# Patient Record
Sex: FEMALE | Race: WHITE
[De-identification: names, ages, dates, MRNs, and addresses within clinical notes are randomized per-mention and may not be internally consistent; named-entity substitution may affect disease eponyms.]

---

## 2018-06-02 ENCOUNTER — HOSPITAL ENCOUNTER (INPATIENT)
Dept: HOSPITAL 17 - NEPC | Age: 78
LOS: 2 days | Discharge: HOME | DRG: 292 | End: 2018-06-04
Attending: FAMILY MEDICINE | Admitting: FAMILY MEDICINE
Payer: MEDICARE

## 2018-06-02 VITALS
OXYGEN SATURATION: 91 % | DIASTOLIC BLOOD PRESSURE: 79 MMHG | TEMPERATURE: 97.7 F | SYSTOLIC BLOOD PRESSURE: 140 MMHG | HEART RATE: 77 BPM | RESPIRATION RATE: 20 BRPM

## 2018-06-02 VITALS
SYSTOLIC BLOOD PRESSURE: 136 MMHG | RESPIRATION RATE: 20 BRPM | DIASTOLIC BLOOD PRESSURE: 80 MMHG | HEART RATE: 78 BPM | TEMPERATURE: 98.4 F | OXYGEN SATURATION: 94 %

## 2018-06-02 VITALS — HEIGHT: 62 IN | BODY MASS INDEX: 31.16 KG/M2 | WEIGHT: 169.32 LBS

## 2018-06-02 VITALS
RESPIRATION RATE: 20 BRPM | OXYGEN SATURATION: 92 % | DIASTOLIC BLOOD PRESSURE: 69 MMHG | HEART RATE: 71 BPM | SYSTOLIC BLOOD PRESSURE: 115 MMHG | TEMPERATURE: 97.2 F

## 2018-06-02 VITALS — SYSTOLIC BLOOD PRESSURE: 128 MMHG | DIASTOLIC BLOOD PRESSURE: 84 MMHG | OXYGEN SATURATION: 95 %

## 2018-06-02 VITALS
DIASTOLIC BLOOD PRESSURE: 61 MMHG | OXYGEN SATURATION: 94 % | RESPIRATION RATE: 20 BRPM | SYSTOLIC BLOOD PRESSURE: 103 MMHG | HEART RATE: 72 BPM | TEMPERATURE: 97.8 F

## 2018-06-02 VITALS
SYSTOLIC BLOOD PRESSURE: 147 MMHG | RESPIRATION RATE: 26 BRPM | DIASTOLIC BLOOD PRESSURE: 92 MMHG | OXYGEN SATURATION: 94 % | HEART RATE: 94 BPM

## 2018-06-02 VITALS
RESPIRATION RATE: 24 BRPM | SYSTOLIC BLOOD PRESSURE: 149 MMHG | DIASTOLIC BLOOD PRESSURE: 67 MMHG | OXYGEN SATURATION: 93 % | HEART RATE: 96 BPM

## 2018-06-02 VITALS — OXYGEN SATURATION: 93 %

## 2018-06-02 DIAGNOSIS — I11.0: Primary | ICD-10-CM

## 2018-06-02 DIAGNOSIS — Z82.3: ICD-10-CM

## 2018-06-02 DIAGNOSIS — Z82.49: ICD-10-CM

## 2018-06-02 DIAGNOSIS — I25.5: ICD-10-CM

## 2018-06-02 DIAGNOSIS — Z90.49: ICD-10-CM

## 2018-06-02 DIAGNOSIS — I25.10: ICD-10-CM

## 2018-06-02 DIAGNOSIS — I42.0: ICD-10-CM

## 2018-06-02 DIAGNOSIS — Z95.1: ICD-10-CM

## 2018-06-02 DIAGNOSIS — Z95.5: ICD-10-CM

## 2018-06-02 DIAGNOSIS — Z79.01: ICD-10-CM

## 2018-06-02 DIAGNOSIS — I50.23: ICD-10-CM

## 2018-06-02 DIAGNOSIS — K11.8: ICD-10-CM

## 2018-06-02 DIAGNOSIS — M19.90: ICD-10-CM

## 2018-06-02 DIAGNOSIS — I44.7: ICD-10-CM

## 2018-06-02 DIAGNOSIS — J43.9: ICD-10-CM

## 2018-06-02 DIAGNOSIS — I25.2: ICD-10-CM

## 2018-06-02 DIAGNOSIS — R25.2: ICD-10-CM

## 2018-06-02 DIAGNOSIS — R73.9: ICD-10-CM

## 2018-06-02 DIAGNOSIS — I48.0: ICD-10-CM

## 2018-06-02 DIAGNOSIS — R74.8: ICD-10-CM

## 2018-06-02 DIAGNOSIS — Z85.828: ICD-10-CM

## 2018-06-02 DIAGNOSIS — Z87.891: ICD-10-CM

## 2018-06-02 DIAGNOSIS — I48.2: ICD-10-CM

## 2018-06-02 DIAGNOSIS — Z95.810: ICD-10-CM

## 2018-06-02 DIAGNOSIS — J98.11: ICD-10-CM

## 2018-06-02 DIAGNOSIS — J30.2: ICD-10-CM

## 2018-06-02 DIAGNOSIS — E78.5: ICD-10-CM

## 2018-06-02 LAB
BASOPHILS # BLD AUTO: 0 TH/MM3 (ref 0–0.2)
BASOPHILS NFR BLD: 0.4 % (ref 0–2)
BUN SERPL-MCNC: 14 MG/DL (ref 7–18)
CALCIUM SERPL-MCNC: 8.5 MG/DL (ref 8.5–10.1)
CHLORIDE SERPL-SCNC: 106 MEQ/L (ref 98–107)
CREAT SERPL-MCNC: 0.96 MG/DL (ref 0.5–1)
EOSINOPHIL # BLD: 0.2 TH/MM3 (ref 0–0.4)
EOSINOPHIL NFR BLD: 3 % (ref 0–4)
ERYTHROCYTE [DISTWIDTH] IN BLOOD BY AUTOMATED COUNT: 14.9 % (ref 11.6–17.2)
GFR SERPLBLD BASED ON 1.73 SQ M-ARVRAT: 56 ML/MIN (ref 89–?)
GLUCOSE SERPL-MCNC: 178 MG/DL (ref 74–106)
HCO3 BLD-SCNC: 26.9 MEQ/L (ref 21–32)
HCT VFR BLD CALC: 47 % (ref 35–46)
HGB BLD-MCNC: 15.2 GM/DL (ref 11.6–15.3)
INR PPP: 1.1 RATIO
LYMPHOCYTES # BLD AUTO: 1.8 TH/MM3 (ref 1–4.8)
LYMPHOCYTES NFR BLD AUTO: 24.8 % (ref 9–44)
MAGNESIUM SERPL-MCNC: 2.3 MG/DL (ref 1.5–2.5)
MCH RBC QN AUTO: 30.5 PG (ref 27–34)
MCHC RBC AUTO-ENTMCNC: 32.4 % (ref 32–36)
MCV RBC AUTO: 94.2 FL (ref 80–100)
MONOCYTE #: 0.5 TH/MM3 (ref 0–0.9)
MONOCYTES NFR BLD: 7 % (ref 0–8)
NEUTROPHILS # BLD AUTO: 4.8 TH/MM3 (ref 1.8–7.7)
NEUTROPHILS NFR BLD AUTO: 64.8 % (ref 16–70)
PLATELET # BLD: 199 TH/MM3 (ref 150–450)
PMV BLD AUTO: 8.4 FL (ref 7–11)
PROTHROMBIN TIME: 11 SEC (ref 9.8–11.6)
RBC # BLD AUTO: 4.99 MIL/MM3 (ref 4–5.3)
SODIUM SERPL-SCNC: 142 MEQ/L (ref 136–145)
TROPONIN I SERPL-MCNC: 0.25 NG/ML (ref 0.02–0.05)
TROPONIN I SERPL-MCNC: 0.31 NG/ML (ref 0.02–0.05)
WBC # BLD AUTO: 7.4 TH/MM3 (ref 4–11)

## 2018-06-02 PROCEDURE — 85027 COMPLETE CBC AUTOMATED: CPT

## 2018-06-02 PROCEDURE — 85730 THROMBOPLASTIN TIME PARTIAL: CPT

## 2018-06-02 PROCEDURE — 94002 VENT MGMT INPAT INIT DAY: CPT

## 2018-06-02 PROCEDURE — 93005 ELECTROCARDIOGRAM TRACING: CPT

## 2018-06-02 PROCEDURE — 80048 BASIC METABOLIC PNL TOTAL CA: CPT

## 2018-06-02 PROCEDURE — 83605 ASSAY OF LACTIC ACID: CPT

## 2018-06-02 PROCEDURE — 94664 DEMO&/EVAL PT USE INHALER: CPT

## 2018-06-02 PROCEDURE — 82550 ASSAY OF CK (CPK): CPT

## 2018-06-02 PROCEDURE — 84484 ASSAY OF TROPONIN QUANT: CPT

## 2018-06-02 PROCEDURE — 94618 PULMONARY STRESS TESTING: CPT

## 2018-06-02 PROCEDURE — 85025 COMPLETE CBC W/AUTO DIFF WBC: CPT

## 2018-06-02 PROCEDURE — 83735 ASSAY OF MAGNESIUM: CPT

## 2018-06-02 PROCEDURE — 85610 PROTHROMBIN TIME: CPT

## 2018-06-02 PROCEDURE — 83880 ASSAY OF NATRIURETIC PEPTIDE: CPT

## 2018-06-02 PROCEDURE — 82552 ASSAY OF CPK IN BLOOD: CPT

## 2018-06-02 PROCEDURE — 71045 X-RAY EXAM CHEST 1 VIEW: CPT

## 2018-06-02 PROCEDURE — 5A09357 ASSISTANCE WITH RESPIRATORY VENTILATION, LESS THAN 24 CONSECUTIVE HOURS, CONTINUOUS POSITIVE AIRWAY PRESSURE: ICD-10-PCS

## 2018-06-02 PROCEDURE — 87040 BLOOD CULTURE FOR BACTERIA: CPT

## 2018-06-02 RX ADMIN — POTASSIUM CHLORIDE SCH MEQ: 20 TABLET, EXTENDED RELEASE ORAL at 21:29

## 2018-06-02 RX ADMIN — POTASSIUM CHLORIDE SCH MEQ: 20 TABLET, EXTENDED RELEASE ORAL at 11:28

## 2018-06-02 RX ADMIN — Medication SCH ML: at 21:00

## 2018-06-02 RX ADMIN — CAPTOPRIL SCH MG: 50 TABLET ORAL at 21:29

## 2018-06-02 RX ADMIN — CARVEDILOL SCH MG: 12.5 TABLET, FILM COATED ORAL at 21:29

## 2018-06-02 RX ADMIN — FUROSEMIDE SCH MG: 10 INJECTION, SOLUTION INTRAMUSCULAR; INTRAVENOUS at 09:00

## 2018-06-02 RX ADMIN — FUROSEMIDE SCH MG: 10 INJECTION, SOLUTION INTRAMUSCULAR; INTRAVENOUS at 18:23

## 2018-06-02 RX ADMIN — APIXABAN SCH MG: 5 TABLET, FILM COATED ORAL at 21:29

## 2018-06-02 RX ADMIN — Medication SCH ML: at 09:00

## 2018-06-02 NOTE — PD
HPI


Chief Complaint:  Respiratory Distress


Time Seen by Provider:  06:27


Travel History


International Travel<30 days:  No


Contact w/Intl Traveler<30days:  No


Traveled to known affect area:  No





History of Present Illness


HPI


78-year-old female presents to the emergency department from home by EMS 

transport for complaint of sudden onset progressively worsening shortness of 

breath since last evening.  Patient has known existing history of CAD with 

previous myocardial infarction dilated ischemic cardiomyopathy with left bundle 

branch block AICD pacemaker hypertension and episodes of flash pulmonary edema.

  Patient has had issues in the past with medication noncompliance or dietary 

discretion as triggers.  Patient also has history of dyslipidemia previous CABG 

coronary stenting and prior alcohol use.  Patient denies any recent febrile 

illness or productive cough.  Patient had dyspnea on exertion and orthopnea.  

Patient did take 40 mg of Lasix this morning on her own prior to arrival of EMS 

and EMS gave her an additional 30 mg of Lasix.





PFSH


Past Medical History


*** Narrative Medical


CAD MI CHF/pulmonary edema AICD pacemaker; cholecystectomy; alcohol use; 

nursing notes reviewed


Arthritis:  Yes


Asthma:  No


Autoimmune Disease:  No


Anxiety:  Yes


Depression:  No


Heart Rhythm Problems:  Yes


Cancer:  Yes (SKIN)


Cardiovascular Problems:  Yes


High Cholesterol:  No


Chemotherapy:  No


Chest Pain:  Yes


Congestive Heart Failure:  Yes


COPD:  No


Diabetes:  No


Endocrine:  No


Gastrointestinal Disorders:  No


GERD:  No


Genitourinary:  No


Hiatal Hernia:  No


Heparin Induced Thrombocytopen:  No


Hypertension:  Yes


Immune Disorder:  No


Implanted Vascular Access Dvce:  No


Kidney Stones:  No


Neurologic:  No


Psychiatric:  No


Reproductive:  No


Respiratory:  Yes


Myocardial Infarction:  Yes (x2)


Radiation Therapy:  No


Renal Failure:  No


Sickle Cell Disease:  No


Sleep Apnea:  No


Thyroid Disease:  No


Ulcer:  No


Menopausal:  Yes


Tubal Ligation:  Yes





Past Surgical History


Abdominal Surgery:  Yes


AICD:  No


Arteriovenous Shunt:  No


Cardiac Surgery:  Yes (stent x1 2013)


Cholecystectomy:  Yes (1999)


Coronary Artery Bypass Graft:  Yes (1992 1 vessel bypassed)


Ear Surgery:  No


Endocrine Surgery:  No


Eye Surgery:  No


Genitourinary Surgery:  No


Gynecologic Surgery:  Yes


Insulin Pump:  No


Joint Replacement:  No


Neurologic Surgery:  No


Oral Surgery:  No


Pacemaker:  No


Thoracic Surgery:  No


Tonsillectomy:  Yes


Other Surgery:  Yes





Social History


Alcohol Use:  Yes ( 1- 2 DRINKS A DAY)


Tobacco Use:  No


Substance Use:  No





Allergies-Medications


(Allergen,Severity, Reaction):  


Coded Allergies:  


     lovastatin (Unverified  Allergy, Severe, SKIN RASH AND ITCHING, 6/2/18)


     acetaminophen (Unverified  Adverse Reaction, Severe, "AGITATION ,DRY MOUTH

, RAPID HEARTBEAT", 6/2/18)


     adhesive (Unverified  Adverse Reaction, Severe, "SKIN BURNS", 6/2/18)


     amoxicillin (Unverified  Adverse Reaction, Severe, "DIARRHEA", 6/2/18)


     atenolol (Unverified  Adverse Reaction, Severe, "INSOMNIA, NERVOUSNESS", 6/ 2/18)


     atorvastatin (Unverified  Adverse Reaction, Severe, "EXTREME FATIGUE AND 

ANXIETY", 6/2/18)


     cetirizine (Unverified  Adverse Reaction, Severe, "FATIGUE AND DROWSINESS"

, 6/2/18)


     ciprofloxacin (Unverified  Adverse Reaction, Severe, "HOT FLUSHING", 6/2/18

)


     codeine (Unverified  Adverse Reaction, Severe, "AGITATION, DRY MOUTH ,

RAPID HEARTBEAT", 6/2/18)


     diazepam (Unverified  Adverse Reaction, Severe, "TREMORS", 6/2/18)


     digoxin (Unverified  Adverse Reaction, Severe, "DIARRHEA AND NAUSEA", 6/2/ 18)


     diltiazem (Unverified  Adverse Reaction, Severe, "DIARRHEA", 6/2/18)


     epinephrine (Unverified  Adverse Reaction, Severe, "HEART RACES, FAINTING"

, 6/2/18)


     fexofenadine (Unverified  Adverse Reaction, Severe, "EXTREME FATIGUE AND 

DROWSINESS", 6/2/18)


     lidocaine (Unverified  Adverse Reaction, Severe, "EXTREME FLUSHING, HOT 

FEELING", 6/2/18)


     losartan (Unverified  Adverse Reaction, Severe, "MADE HEART FEEL IT WAS 

LABORING TO WORK", 6/2/18)


     meperidine (Unverified  Adverse Reaction, Severe, "DIZZINESS, RAPID 

HEARTBEAT", 6/2/18)


     procaine (Unverified  Adverse Reaction, Severe, "RAPID HEARTBEAT", 6/2/18)


     propoxyphene (Unverified  Adverse Reaction, Severe, "AGITATION ,DRY MOUTH, 

RAPID HEARTBEAT", 6/2/18)


Uncoded Allergies:  


     HISMANEL (Adverse Reaction, Severe, "EXTREME FATIGUE AND PVC'S, 4/2/11)


     SELDANE (Adverse Reaction, Severe, "PVC'S", 4/2/11)


Reported Meds & Prescriptions





Reported Meds & Active Scripts


Active


Reported


Flonase Nasal Spray (Fluticasone Nasal Spray) 50 Mcg/Act Spray 100 Mcg EACH 

NARE BID


Claritin (Loratadine) 10 Mg Cap 10 Mg PO DAILY


Aspirin 81 Mg Chew 81 Mg CHEW DAILY


Doxycycline (Doxycycline (Monohydrate)) 100 Mg Cap   


Eliquis (Apixaban) 5 Mg Tab 5 Mg PO BID


Lasix (Furosemide) 40 Mg Tab 40 Mg PO DAILY


[Capoten]   50 Mg PO BID


     TWICE DAILY PLUS 1/2 MG @ 1500.


Coreg (Carvedilol) 12.5 Mg Tab 12.5 Mg PO BID








Review of Systems


Except as stated in HPI:  all other systems reviewed are Neg


General / Constitutional:  No: Fever, Chills


HENT:  No: Congestion


Cardiovascular:  No: Chest Pain or Discomfort, Edema


Respiratory:  Positive: Shortness of Breath, Orthopnea, No: Pleuritic Pain


Gastrointestinal:  No: Nausea, Vomiting, Abdominal Pain


Genitourinary:  No: Dysuria


Musculoskeletal:  No: Myalgias, Arthralgias


Skin:  No Rash


Neurologic:  No: Weakness, Dizziness, Syncope


Psychiatric:  No: Anxiety


Hematologic/Lymphatic:  No: Easy Bruising





Physical Exam


Narrative


GENERAL: Well-developed well-nourished female and moderate respiratory distress 

after receiving Lasix supplemental oxygen and sublingual nitroglycerin in route 

to the hospital


SKIN: Warm and dry.


HEAD: Normocephalic.


EYES: No scleral icterus. No injection or drainage. 


NECK: Supple, trachea midline. No JVD or lymphadenopathy.


CARDIOVASCULAR: Regular rate and rhythm without murmurs, gallops, or rubs. 


RESPIRATORY: Breath sounds equal bilaterally. No accessory muscle use.  

Diminished breath sounds with bibasilar crackles


GASTROINTESTINAL: Abdomen soft, non-tender, nondistended. 


MUSCULOSKELETAL: No cyanosis, or edema. 


BACK: Nontender without obvious deformity. No CVA tenderness.








Data


Data


Last Documented VS





Vital Signs








  Date Time  Temp Pulse Resp B/P (MAP) Pulse Ox O2 Delivery O2 Flow Rate FiO2


 


6/2/18 06:35     95 BiPAP  55


 


6/2/18 06:35    128/84 (99)    


 


6/2/18 06:28  94 26     








Orders





 Orders


Electrocardiogram (6/2/18 06:27)


Basic Metabolic Panel (Bmp) (6/2/18 06:27)


Ckmb (Isoenzyme) Profile (6/2/18 06:27)


Complete Blood Count With Diff (6/2/18 06:27)


Magnesium (Mg) (6/2/18 06:27)


Prothrombin Time / Inr (Pt) (6/2/18 06:27)


Act Partial Throm Time (Ptt) (6/2/18 06:27)


Troponin I (6/2/18 06:27)


Ecg Monitoring (6/2/18 06:27)


Bilateral Bp Monitoring (6/2/18 06:27)


Iv Access Insert/Monitor (6/2/18 06:27)


Oximetry (6/2/18 06:27)


Oxygen Administration (6/2/18 06:27)


Aspirin Chew (Aspirin Chew) (6/2/18 06:30)


Nitroglycerin 2% Oint (Nitroglycerin 2% (6/2/18 06:30)


Sodium Chloride 0.9% Flush (Ns Flush) (6/2/18 06:30)


Resp Bipap / Cpap Non Invas Vt (6/2/18 )


Albuterol-Ipratropium Neb (Duoneb Neb) (6/2/18 06:30)


Chest, Single Ap (6/2/18 )


Lactic Acid (6/2/18 06:43)


Blood Culture (6/2/18 06:43)


CKMB (6/2/18 06:25)


CKMB% (6/2/18 06:25)





Labs





Laboratory Tests








Test


  6/2/18


06:25


 


White Blood Count 7.4 TH/MM3 


 


Red Blood Count 4.99 MIL/MM3 


 


Hemoglobin 15.2 GM/DL 


 


Hematocrit 47.0 % 


 


Mean Corpuscular Volume 94.2 FL 


 


Mean Corpuscular Hemoglobin 30.5 PG 


 


Mean Corpuscular Hemoglobin


Concent 32.4 % 


 


 


Red Cell Distribution Width 14.9 % 


 


Platelet Count 199 TH/MM3 


 


Mean Platelet Volume 8.4 FL 


 


Neutrophils (%) (Auto) 64.8 % 


 


Lymphocytes (%) (Auto) 24.8 % 


 


Monocytes (%) (Auto) 7.0 % 


 


Eosinophils (%) (Auto) 3.0 % 


 


Basophils (%) (Auto) 0.4 % 


 


Neutrophils # (Auto) 4.8 TH/MM3 


 


Lymphocytes # (Auto) 1.8 TH/MM3 


 


Monocytes # (Auto) 0.5 TH/MM3 


 


Eosinophils # (Auto) 0.2 TH/MM3 


 


Basophils # (Auto) 0.0 TH/MM3 


 


CBC Comment DIFF FINAL 


 


Differential Comment  


 


Prothrombin Time 11.0 SEC 


 


Prothromb Time International


Ratio 1.1 RATIO 


 


 


Activated Partial


Thromboplast Time 23.7 SEC 


 


 


Blood Urea Nitrogen 14 MG/DL 


 


Creatinine 0.96 MG/DL 


 


Random Glucose 178 MG/DL 


 


Calcium Level 8.5 MG/DL 


 


Magnesium Level 2.3 MG/DL 


 


Sodium Level 142 MEQ/L 


 


Potassium Level 4.4 MEQ/L 


 


Chloride Level 106 MEQ/L 


 


Carbon Dioxide Level 26.9 MEQ/L 


 


Anion Gap 9 MEQ/L 


 


Estimat Glomerular Filtration


Rate 56 ML/MIN 


 


 


Lactic Acid Level 1.9 mmol/L 


 


Total Creatine Kinase 106 U/L 


 


Troponin I 0.31 NG/ML 











MDM


Medical Decision Making


Medical Screen Exam Complete:  Yes


Emergency Medical Condition:  Yes


Medical Record Reviewed:  Yes


Interpretation(s)


EKG ventricular paced rhythm with occasional PVCs QS inferiorly age-

indeterminate poor R-wave progression mary laterally


Differential Diagnosis


Dyspnea/pulmonary edema CHF ACS MI renal insufficiency renal failure pneumonia 

PE


Narrative Course


Patient received from EMS stretcher placed on cardiac monitor with continuous 

pulse oximetry IV access obtained specimens collected and sent for resulting 

prior to arrival patient is received a total of 70 mg of Lasix p.o. and IV 

administration sublingual nitroglycerin 1 and arrived with CPAP has been 

transitioned to BiPAP.  Patient is symptomatically improved.  Specimens 

collected and sent for resulting





At 7:20 PM care signed over to Dr. Molina for follow-up of pending labs and 

patient disposition with plan for admission











Zeynep Aguilar MD Jun 2, 2018 06:34

## 2018-06-02 NOTE — PD
Physical Exam


Narrative


Received sign out from previous team to follow up labs and admit patient.





77yo F with CHF, CAD s/p CABG, HLD here with sob since last night.  Pt was 

given lasix IV by [facility] and took PO lasix herself prior to arrival.  Pt was 

placed on BIPAP by [facility] and has been more comfortable on the BIPAP in the ED.  

Labs reviewed, no leukocytosis.  Troponin is elevated at 0.31.  Lactic acid 

normal at 1.9.  CXR showed stable appearance of emphysema and bilateral basilar 

atelectasis.  Pt was admitted in 2014 for pulmonary edema and elevated troponin 

which Dr. Akbar felt was due to cardiac enzyme leak from acute on chronic 

systolic congestive heart failure.  Pt evaluated at bedside and states she 

feels much better on the BIPAP.  She is saturating at 98% on BIPAP.  She does 

have bilateral lower lung crackles.  She was given a nitroglycerin paste and 

aspirin.  Will take pt off BIPAP and use it as needed and observe pt today and 

trend troponin.  Pt doing well off Bipap on nasal cannula.  Pt has no chest pain

, also feel elevated troponin may be secondary to acute on chronic CHF 

exacerbation.  BNP added and elevated at 998.  Discussed with resident 

physician and accepted to Dr. Ceja's service.





Data


Data


Last Documented VS





Vital Signs








  Date Time  Temp Pulse Resp B/P (MAP) Pulse Ox O2 Delivery O2 Flow Rate FiO2


 


6/2/18 08:08  96 24 149/67 (94) 93 BiPAP  


 


6/2/18 06:35        55








Orders





 Orders


Electrocardiogram (6/2/18 06:27)


Basic Metabolic Panel (Bmp) (6/2/18 06:27)


Ckmb (Isoenzyme) Profile (6/2/18 06:27)


Complete Blood Count With Diff (6/2/18 06:27)


Magnesium (Mg) (6/2/18 06:27)


Prothrombin Time / Inr (Pt) (6/2/18 06:27)


Act Partial Throm Time (Ptt) (6/2/18 06:27)


Troponin I (6/2/18 06:27)


Ecg Monitoring (6/2/18 06:27)


Bilateral Bp Monitoring (6/2/18 06:27)


Iv Access Insert/Monitor (6/2/18 06:27)


Oximetry (6/2/18 06:27)


Oxygen Administration (6/2/18 06:27)


Aspirin Chew (Aspirin Chew) (6/2/18 06:30)


Nitroglycerin 2% Oint (Nitroglycerin 2% (6/2/18 06:30)


Sodium Chloride 0.9% Flush (Ns Flush) (6/2/18 06:30)


Resp Bipap / Cpap Non Invas Vt (6/2/18 )


Albuterol-Ipratropium Neb (Duoneb Neb) (6/2/18 06:30)


Chest, Single Ap (6/2/18 )


Lactic Acid (6/2/18 06:43)


Blood Culture (6/2/18 06:43)


CKMB (6/2/18 06:25)


CKMB% (6/2/18 06:25)


B-Type Natriuretic Peptide (6/2/18 07:50)


Admit Order (Ed Use Only) (6/2/18 08:17)





Labs





Laboratory Tests








Test


  6/2/18


06:25


 


White Blood Count 7.4 TH/MM3 


 


Red Blood Count 4.99 MIL/MM3 


 


Hemoglobin 15.2 GM/DL 


 


Hematocrit 47.0 % 


 


Mean Corpuscular Volume 94.2 FL 


 


Mean Corpuscular Hemoglobin 30.5 PG 


 


Mean Corpuscular Hemoglobin


Concent 32.4 % 


 


 


Red Cell Distribution Width 14.9 % 


 


Platelet Count 199 TH/MM3 


 


Mean Platelet Volume 8.4 FL 


 


Neutrophils (%) (Auto) 64.8 % 


 


Lymphocytes (%) (Auto) 24.8 % 


 


Monocytes (%) (Auto) 7.0 % 


 


Eosinophils (%) (Auto) 3.0 % 


 


Basophils (%) (Auto) 0.4 % 


 


Neutrophils # (Auto) 4.8 TH/MM3 


 


Lymphocytes # (Auto) 1.8 TH/MM3 


 


Monocytes # (Auto) 0.5 TH/MM3 


 


Eosinophils # (Auto) 0.2 TH/MM3 


 


Basophils # (Auto) 0.0 TH/MM3 


 


CBC Comment DIFF FINAL 


 


Differential Comment  


 


Prothrombin Time 11.0 SEC 


 


Prothromb Time International


Ratio 1.1 RATIO 


 


 


Activated Partial


Thromboplast Time 23.7 SEC 


 


 


Blood Urea Nitrogen 14 MG/DL 


 


Creatinine 0.96 MG/DL 


 


Random Glucose 178 MG/DL 


 


Calcium Level 8.5 MG/DL 


 


Magnesium Level 2.3 MG/DL 


 


Sodium Level 142 MEQ/L 


 


Potassium Level 4.4 MEQ/L 


 


Chloride Level 106 MEQ/L 


 


Carbon Dioxide Level 26.9 MEQ/L 


 


Anion Gap 9 MEQ/L 


 


Estimat Glomerular Filtration


Rate 56 ML/MIN 


 


 


Lactic Acid Level 1.9 mmol/L 


 


Total Creatine Kinase 106 U/L 


 


Creatine Kinase MB 2.6 NG/ML 


 


Troponin I 0.31 NG/ML 


 


B-Type Natriuretic Peptide 998 PG/ML 











MDM


Supervised Visit with DEE:  No


Diagnosis





 Primary Impression:  


 Acute on chronic systolic congestive heart failure





Admitting Information


Admitting Physician Requests:  it











Eleni Molina DO Jun 2, 2018 07:45

## 2018-06-02 NOTE — HHI.HP
[facility]


Service


Family Medicine


Primary Care Physician


Charles Owens MD


Admission Diagnosis





Acute on chronic CHF exacerbation, elevated troponin


Diagnoses:  


International Travel<30 Days:  No


Contact w/Intl Traveler<30days:  No


Known Affected Area:  No


History of Present Illness


Patient is a 78-year-old female with past history of myocardial infarction 2, 

CABG, A. fib, hypertension, CHF who presents today for shortness of breath.  

She reports that for the past week she has had increasing shortness of breath.  

It was mildly improved by increasing her daily Coreg.  She was taking 12.5 in 

the morning and 18.75 at night, she increased it to 12.5 in the morning and 25 

at night.  This improved her breathing for traumatic time, however this morning 

her breathing was worse and she believed she needed to go to the hospital.  She 

states it feels similar to the last time she had flash pulmonary edema in 2014.

  She also reports a cramps in her legs and feet this past week, however 

attributes it to a recent calorie restricted diet she is on.  She increased her 

potassium intake and the cramps resolved.  She denies redness, swelling of her 

legs, immobilization or surgery in the past 4 weeks, previous PE or DVT, 

hemoptysis, previous malignancy.  She states she went to her cardiologist, Dr. Akbar, and Thursday to check her pacemaker.  She reports there was over 100 

A. fib events over the last week.  She did note some occasional palpitations, 

however usually they are subtle.  She reports a 1 pacemaker defibrillation in 

the past, however none recently.  She reports she has dyspnea on exertion.  

While in the ED she reports her breathing has improved on BiPAP.  States she 

does not use CPAP at night.  Denies chest pain, headache, change in vision, 

nausea, vomiting, fever, chills, abdominal pain, change in bowel or bladder 

habits.  No other complaints today.





flash edema, since 2013,2014. Problems breathing for passed week, upped coreg. 

Started 12.5 am nd 25 pm. cut the night down to 18. went back 25 last week. and 

felt better. Cramps in legs and feet. Increased potassium because on low aldo 

diet, cramps went away. Sinus and allergy problems. [facility] office last 

Thursday for pacer check. over 100 afib over last week. No CPAP at night. MIGUEL 

worsening. Pacer for "bad heart" defib went off 1 time in the past, none 

recently. 





-HA, vision, chest pain, abd, n/v/f/c diarrhea const, 





SOb better now, 


 (Manan Trent MD R1)





Review of Systems


Constitutional:  DENIES: Fever, Chills


Endocrine:  DENIES: Polydipsia, Polyuria


Eyes:  DENIES: Blurred vision, Diplopia, Eye inflammation, Eye pain, Vision loss

, Photosensitivity, Double Vision


Ears, nose, mouth, throat:  COMPLAINS OF: Tinnitus (history of), Running Nose, 

DENIES: Hearing loss, Ear Pain, Epistaxis


Respiratory:  COMPLAINS OF: Shortness of breath, DENIES: Cough, Wheezing, 

Hemoptysis, Sputum production


Cardiovascular:  COMPLAINS OF: Palpitations, Dyspnea on Exertion, DENIES: Chest 

pain


Gastrointestinal:  DENIES: Abdominal pain, Black stools, Bloody stools, 

Constipation, Diarrhea, Nausea


Genitourinary:  DENIES: Urinary frequency, Hematuria, Dysuria


Integumentary:  DENIES: Abnormal pigmentation, Rash


Hematologic/lymphatic:  DENIES: Bruising, Lymphadenopathy


Immunologic/allergic:  DENIES: Eczema, Urticaria


Neurologic:  COMPLAINS OF: Abnormal gait (nots some unsteady gait), DENIES: 

Headache, Localized weakness, Paresthesias


Psychiatric:  COMPLAINS OF: Anxiety, DENIES: Confusion, Mood changes, Suicidal 

Ideation, Homicidal Ideation (Manan Trent MD R1)





Past Family Social History


Past Medical History


MI x 2


CABG


AFIB


HTN


Flash pulmonary edema


CHF


Seasonal allergies


Past Surgical History


Cholecystectomy 1999


Pacemaker 2014


 (Manan Trent MD R1)


Allergies:  


Coded Allergies:  


     lovastatin (Unverified  Allergy, Severe, SKIN RASH AND ITCHING, 6/2/18)


     acetaminophen (Unverified  Adverse Reaction, Severe, "AGITATION ,DRY MOUTH

, RAPID HEARTBEAT", 6/2/18)


     adhesive (Unverified  Adverse Reaction, Severe, "SKIN BURNS", 6/2/18)


     amoxicillin (Unverified  Adverse Reaction, Severe, "DIARRHEA", 6/2/18)


     atenolol (Unverified  Adverse Reaction, Severe, "INSOMNIA, NERVOUSNESS", 6/ 2/18)


     atorvastatin (Unverified  Adverse Reaction, Severe, "EXTREME FATIGUE AND 

ANXIETY", 6/2/18)


     cetirizine (Unverified  Adverse Reaction, Severe, "FATIGUE AND DROWSINESS"

, 6/2/18)


     ciprofloxacin (Unverified  Adverse Reaction, Severe, "HOT FLUSHING", 6/2/18

)


     codeine (Unverified  Adverse Reaction, Severe, "AGITATION, DRY MOUTH ,

RAPID HEARTBEAT", 6/2/18)


     diazepam (Unverified  Adverse Reaction, Severe, "TREMORS", 6/2/18)


     digoxin (Unverified  Adverse Reaction, Severe, "DIARRHEA AND NAUSEA", 6/2/ 18)


     diltiazem (Unverified  Adverse Reaction, Severe, "DIARRHEA", 6/2/18)


     epinephrine (Unverified  Adverse Reaction, Severe, "HEART RACES, FAINTING"

, 6/2/18)


     fexofenadine (Unverified  Adverse Reaction, Severe, "EXTREME FATIGUE AND 

DROWSINESS", 6/2/18)


     lidocaine (Unverified  Adverse Reaction, Severe, "EXTREME FLUSHING, HOT 

FEELING", 6/2/18)


     losartan (Unverified  Adverse Reaction, Severe, "MADE HEART FEEL IT WAS 

LABORING TO WORK", 6/2/18)


     meperidine (Unverified  Adverse Reaction, Severe, "DIZZINESS, RAPID 

HEARTBEAT", 6/2/18)


     procaine (Unverified  Adverse Reaction, Severe, "RAPID HEARTBEAT", 6/2/18)


     propoxyphene (Unverified  Adverse Reaction, Severe, "AGITATION ,DRY MOUTH, 

RAPID HEARTBEAT", 6/2/18)


Uncoded Allergies:  


     HISMANEL (Adverse Reaction, Severe, "EXTREME FATIGUE AND PVC'S, 4/2/11)


     SELDANE (Adverse Reaction, Severe, "PVC'S", 4/2/11)


Family History


Father: Stroke at 51, heart disease


Mother: Heart disease


Social History


EtOH: glass of wine a day


Tobacco: used to smoke 1ppd for 30 years, quit 1990


Drugs: None


 (Manan Trent MD R1)





Physical Exam


Vital Signs





Vital Signs








  Date Time  Temp Pulse Resp B/P (MAP) Pulse Ox O2 Delivery O2 Flow Rate FiO2


 


6/2/18 08:08  96 24 149/67 (94) 93 BiPAP  


 


6/2/18 06:35     95 BiPAP  55


 


6/2/18 06:35      BiPAP  55


 


6/2/18 06:35    128/84 (99)    


 


6/2/18 06:35     95 BiPAP  55


 


6/2/18 06:28  94 26 147/92 (110) 94   


 


6/2/18 06:25     93   55








Physical Exam


GENERAL: This is a well-nourished, well-developed patient, in no apparent 

distress.


SKIN: No rashes, ecchymoses or lesions. Cool and dry.


HEAD: Atraumatic. Normocephalic. No temporal or scalp tenderness.


EYES: Pupils equal round and reactive. Extraocular motions intact. No scleral 

icterus. No injection or drainage. 


ENT: Nose without bleeding, purulent drainage or septal hematoma. Throat 

without erythema, tonsillar hypertrophy or exudate. Uvula midline. Airway 

patent.


NECK: Trachea midline. No JVD or lymphadenopathy. Supple, nontender, no 

meningeal signs.


CARDIOVASCULAR: Regular rate and rhythm without murmurs, gallops, or rubs. Rate/

rhythm take by auscultation and coinciding palpation at bedside. 


RESPIRATORY:  Breath sounds equal bilaterally. Rales at b/l lung bases. No 

wheezes, or rhonchi.  


GASTROINTESTINAL: Abdomen soft, non-tender, nondistended. No hepato-splenomegaly

, or palpable masses. No guarding.


MUSCULOSKELETAL: Extremities without clubbing, cyanosis, or edema. No joint 

tenderness, effusion, or edema noted. No calf tenderness. Negative Homans sign 

bilaterally.


NEUROLOGICAL: Awake and alert. Cranial nerves II through XII intact.  Motor and 

sensory grossly within normal limits. Five out of 5 muscle strength in all 

muscle groups.  Normal speech.


Laboratory





Laboratory Tests








Test


  6/2/18


06:25


 


White Blood Count 7.4 


 


Red Blood Count 4.99 


 


Hemoglobin 15.2 


 


Hematocrit 47.0 


 


Mean Corpuscular Volume 94.2 


 


Mean Corpuscular Hemoglobin 30.5 


 


Mean Corpuscular Hemoglobin


Concent 32.4 


 


 


Red Cell Distribution Width 14.9 


 


Platelet Count 199 


 


Mean Platelet Volume 8.4 


 


Neutrophils (%) (Auto) 64.8 


 


Lymphocytes (%) (Auto) 24.8 


 


Monocytes (%) (Auto) 7.0 


 


Eosinophils (%) (Auto) 3.0 


 


Basophils (%) (Auto) 0.4 


 


Neutrophils # (Auto) 4.8 


 


Lymphocytes # (Auto) 1.8 


 


Monocytes # (Auto) 0.5 


 


Eosinophils # (Auto) 0.2 


 


Basophils # (Auto) 0.0 


 


CBC Comment DIFF FINAL 


 


Differential Comment  


 


Prothrombin Time 11.0 


 


Prothromb Time International


Ratio 1.1 


 


 


Activated Partial


Thromboplast Time 23.7 


 


 


Blood Urea Nitrogen 14 


 


Creatinine 0.96 


 


Random Glucose 178 


 


Calcium Level 8.5 


 


Magnesium Level 2.3 


 


Sodium Level 142 


 


Potassium Level 4.4 


 


Chloride Level 106 


 


Carbon Dioxide Level 26.9 


 


Anion Gap 9 


 


Estimat Glomerular Filtration


Rate 56 


 


 


Lactic Acid Level 1.9 


 


Total Creatine Kinase 106 


 


Creatine Kinase MB 2.6 


 


Troponin I 0.31 














 Date/Time


Source Procedure


Growth Status


 


 


 6/2/18 06:50


Blood Peripheral Aerobic Blood Culture


Pending Received


 


 6/2/18 06:50


Blood Peripheral Anaerobic Blood Culture


Pending Received








 (Manan Trent MD R1)


Result Diagram:  


6/2/18 0625 6/2/18 0625








Caprini VTE Risk Assessment


Caprini VTE Risk Assessment:  Mod/High Risk (score >= 2)


 (Manan Trent MD R1)





Assessment and Plan


Assessment and Plan


78-year-old female with past history of myocardial infarction 2, CABG, A. fib, 

hypertension, CHF who presented for shortness of breath. Requiring BIPAP in ED. 

Likely acute on chronic CHF exacerbation. Well's score of 0. No signs of DVT.


Code Status


Full


Discussed Condition With


ED physician


 (Manan Trent MD R1)


Attending Attestation


Patient seen and examined, discussed with resident team. I agree with 

assessment and management as documented and discussed with me.





The patient has been seen and examined. The chart and all resident notes have 

been reviewed. I agree that inpatient care is appropriate and that a two 

midnight stay is expected for the reasons documented in the resident history 

and physical. I have discussed this with the resident and certify the resident

s order for inpatient admission.





Cyndee Barfield is a 77yo lady with significant heart history - chronic 

systolic CHF with EF 30-35%, AICD, a fib, and h/o flash pulmonary edema 

admitted after experiencing worsening SOB. She is being treated for CHF 

exacerbation as documented.





At the time of my exam, she remains on oxygen supplementation by nasal cannula, 

having required BiPAP earlier in the day. 





Additional diagnosis:


hyperglycemia: No known treatment for diabetes mellitus. May be stress 

response. Will monitor glucose in morning labs, and treat if needed.


 (Orly Ceja MD)


Problem List:  


(1) Acute on chronic systolic congestive heart failure


ICD Codes:  I50.23 - Acute on chronic systolic congestive heart failure


Status:  Acute


Plan:  Patient with past history of CHF, currently with shortness of breath, 

crackles.  Improves on BiPAP.  Troponin on admission 0.31.  Known to 

cardiologist Dr. Akbar. Echocardiogram in 2013 shows EF 30-35%





-BiPAP as needed


-Lasix 40 mg twice daily


-Follow-up troponins, EKGs


-Follow-up cardiology recommendations


-CXR tomorrow





(2) Elevated troponin I level


ICD Codes:  R79.89 - Elevated troponin I level


Status:  Acute


Plan:  Patient with elevated troponin of 0.31 on admission.  Historically 

between 0.23 and 2.33. 





-Follow-up lab work as above





(3) Afib


ICD Codes:  I48.91 - Unspecified atrial fibrillation


Plan:  History of A. fib.  Reports many recent events of A. fib when having her 

pacemaker examined this past week.  Not in A. fib when examined on admission.





-Continue current outpatient meds


-Follow-up cardiology or condition





(4) Hypertension


ICD Codes:  I10 - Hypertension


Status:  Chronic


Plan:  History of hypertension.





-Continue home medications





(5) Parotid gland fullness


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


Plan:  History of right parotid gland scarring. On Doxycycline daily outpatient.





-Continue home medications





(6) FEN


Plan:  Fluids:


-Tolerating p.o.


Electrolytes:


-Monitor and replete as needed


Nutrition:


-Tolerating p.o.


PPx:


-On Eliquis


 (Manan Trent MD R1)





Physician Certification


2 Midnight Certification Type:  Admission for Inpatient Services


Order for Inpatient Services


The services are ordered in accordance with Medicare regulations or non-

Medicare payer requirements, as applicable.  In the case of services not 

specified as inpatient-only, they are appropriately provided as inpatient 

services in accordance with the 2-midnight benchmark.


Estimated LOS (days):  2


2 days is the estimated time the patient will need to remain in the hospital, 

assuming treatment plan goals are met and no additional complications.


Post-Hospital Plan:  Home


 (Manan Trent MD R1)











Mnaan Trent MD R1 Jun 2, 2018 08:21


Orly Ceja MD Jun 2, 2018 20:05

## 2018-06-02 NOTE — RADRPT
EXAM DATE:  6/2/2018 6:50 AM EDT

AGE/SEX:        78 years / Female



INDICATIONS:  Shortness of breath.



CLINICAL DATA:  This is the patient's initial encounter. Patient reports that signs and symptoms have
 been present for 1 day and indicates a pain score of 0/10. 

                                                                          

MEDICAL/SURGICAL HISTORY:       None. Pacemaker.  CABG.



COMPARISON:      [facility] [place], CHEST SINGLE AP, 5/17/2013.  .



FINDINGS:  

Single upright AP view the chest demonstrates persistent bilateral basilar airspace opacities. Heart 
size is mildly enlarged. Pulmonary vasculature is normal in caliber. Dual lead AICD.



CONCLUSION: 

Stable appearance of emphysema and bilateral basilar atelectasis.



Electronically signed by: Melissa Gomez MD  6/2/2018 6:56 AM EDT

## 2018-06-02 NOTE — EKG
Date Performed: 06/02/2018       Time Performed: 06:43:12

 

PTAGE:      78 years

 

EKG:      Generally atrial sense of ventricular paced rhythm with fairly frequent ventricular ectopy 
present. 

 

 PREVIOUS TRACING            : 05/30/2014 00.06 Since previous tracing, left bundle branch block has 
been replaced with paced rhythm. The ventricular ectopy is new.

 

DOCTOR:   Brien Craven  Interpretating Date/Time  06/02/2018 14:23:32

## 2018-06-02 NOTE — MB
cc:

Robert De Los Santos MD, David A MD

****

 

 

DATE:

06/02/2018

 

REASON FOR CONSULTATION:

Shortness of breath and congestive heart failure.

 

PRIMARY CARE PHYSICIAN:

Dr. Charles Owens.

 

HISTORY OF PRESENT ILLNESS:

Ms. Barfield is a 78-year-old white female with a history of ischemic 

cardiomyopathy, chronic systolic congestive heart failure, prior 

coronary artery bypass grafting, remote myocardial infarction x 2, 

paroxysmal atrial fibrillation, who presented to the emergency room 

this morning via [facility] because of paroxysmal nocturnal dyspnea last 

night.  She tells me that she has not been feeling well over the past 

2 weeks.  She did have her defibrillator interrogated a couple of days

ago at Dr. Akbar's office and although it was functioning 

normally, she was told she was having frequent episodes of atrial 

fibrillation.  She has not felt those and has had no tachycardia.  She

has been taking her medication as directed.  She denies any angina.  

She has had no defibrillator discharges.

 

CURRENT MEDICATIONS:

Include:

1.  Aspirin 81 mg daily.

2.  Claritin 10 mg daily.

3.  Flonase nasal spray 2 sprays each nostril daily.

4.  Eliquis 5 mg b.i.d.

5.  Carvedilol 12.5 mg b.i.d.  She was taking 25 in the evening and 

12.5 in the morning at home.

6.  Captopril 50 mg b.i.d.

7.  Furosemide 40 mg IV b.i.d.  She was on 20 mg p.o. daily at home.

8.  Potassium chloride 20 mEq p.o. b.i.d.

 

ALLERGIES:

EXTENSIVE LIST OF MEDICATION INTOLERANCES INCLUDING LOVASTATIN, 

ACETAMINOPHEN, ADHESIVE TAPE, AMOXICILLIN, ATENOLOL, ATORVASTATIN, 

CETIRIZINE, CIPROFLOXACIN, CODEINE, DIAZEPAM, DIGOXIN, DILTIAZEM, 

EPINEPHRINE, FEXOFENADINE, LIDOCAINE, LOSARTAN, MEPERIDINE, PROCAINE, 

PROPOXYPHENE, HISMANAL.

 

PAST MEDICAL HISTORY:

As mentioned above.  In addition, she has had longstanding 

hypertension, recurrent episodes of flash pulmonary edema, seasonal 

allergies.  Two myocardial infarctions remote in the 1990s.  Denies 

history of stroke or diabetes.

 

PAST SURGICAL HISTORY:

Cholecystectomy in 1999, CRT-D pacemaker defibrillator 2014.

 

FAMILY HISTORY:

Heart disease in father and mother.

 

SOCIAL HISTORY:

The patient consumes 1 glass of wine daily.  She was a cigarette 

smoker of 1 pack a day for 30 years, quit in 1990.  No illicit drug 

use.

 

REVIEW OF SYSTEMS:

Denies lower extremity edema or claudication.  She does describe 

recent orthopnea and PND type symptoms.  She denies any angina.  She 

denies any fevers, chills, night sweats, nausea, vomiting or diarrhea.

 Denies bleeding or clotting disorders.  Except for that mentioned in 

the HPI, a complete 14-point review of systems is otherwise negative.

 

PHYSICAL EXAMINATION:

GENERAL:  Reveals an elderly, overweight, white female sitting up in 

bed in no distress at this time.

VITAL SIGNS:  Blood pressure 149/67 mmHg, heart rate is 96 and 

regular, respiratory rate 24, temperature afebrile.  Oxygen saturation

93%.  She was on BiPAP earlier.  She is currently on nasal cannula 

oxygen.

HEENT:  Head is normocephalic and atraumatic.  Pupils equal, round, 

reactive to light.  Sclerae are anicteric.  Extraocular movements 

intact.

NECK:  Supple.  There is no adenopathy.  There is mild jugular venous 

distention at 90 degrees.  Carotid upstrokes normal.  No bruits.  

Thyroid exam is normal.

LUNGS:  Clear at this time.

HEART:  PMI is slightly displaced and diffuse.  S1, S2 are normal.  

Frequent premature contractions.  There is a grade I/VI systolic 

murmur at the base.  No diastolic murmur, gallops, or rubs.

ABDOMEN:  Obese.  Bowel sounds present.  Soft and nontender.  No 

hepatosplenomegaly, masses, or bruits.

EXTREMITIES:  No cyanosis, clubbing or edema.  Perfusion is adequate 

in the upper and lower extremities.  There are no femoral bruits.

NEUROLOGIC:  Nonfocal.

 

DIAGNOSTIC STUDIES:

An EKG from the emergency room this morning shows a sinus rhythm with 

electronic dual chamber pacemaker rhythm with premature ventricular 

contractions.

 

Chest x-ray shows stable appearance of emphysema and bilateral basilar

atelectasis.

 

LABORATORY DATA:

CBC:  White count 7.4; hemoglobin 15.2; platelet count 199,000.  Coags

are normal.  Electrolytes are normal with a potassium of 4.4, BUN 14, 

creatinine 0.96, glucose 178.  Lactic acid 1.9.  Troponin I 0.31.  CPK

106.  Magnesium 2.3.  .

 

IMPRESSION:

1.  Acute on chronic systolic congestive heart failure.

2.  Atherosclerotic heart disease with elevated troponin I.

3.  Hypertensive heart disease.

4.  Intolerances to multiple medications.

5.  Moderate left ventricular dysfunction, status post CRT-D therapy. 

Functioning normally on recent interrogation.

6.  Paroxysmal atrial fibrillation, currently in AV paced rhythm.

7.  History of chronic left bundle branch block.

 

RECOMMENDATIONS:

I agree with increasing the aggressiveness of her diuresis and 

continue intravenous Lasix for the next 24 hours 40 mg q.12 hours.  

Follow I's and O's closely.  Continue to monitor serial cardiac 

enzymes to see if they are trending up.  Continue to monitor on 

telemetry.  Leave Coreg 12.5 mg BID for now.  Add Inspra 25 mg 

p.o. daily.   Maintain electrolyte balance and a potassium of 4.0.  

I will continue to follow her over the weekend with additional 

recommendations as required.

 

I thank you for allowing us to participate in the care of this 

patient.

 

 

__________________________________

MD MATHEUS Rivera/AILIN

D: 06/02/2018, 12:13 PM

T: 06/02/2018, 03:06 PM

Visit #: [id number]

Job #: [id number]

SAGAR

## 2018-06-03 VITALS
OXYGEN SATURATION: 95 % | DIASTOLIC BLOOD PRESSURE: 67 MMHG | RESPIRATION RATE: 20 BRPM | TEMPERATURE: 97.6 F | SYSTOLIC BLOOD PRESSURE: 135 MMHG | HEART RATE: 68 BPM

## 2018-06-03 VITALS
SYSTOLIC BLOOD PRESSURE: 133 MMHG | OXYGEN SATURATION: 98 % | TEMPERATURE: 97.9 F | DIASTOLIC BLOOD PRESSURE: 92 MMHG | HEART RATE: 79 BPM | RESPIRATION RATE: 20 BRPM

## 2018-06-03 VITALS
DIASTOLIC BLOOD PRESSURE: 62 MMHG | TEMPERATURE: 97.8 F | OXYGEN SATURATION: 93 % | HEART RATE: 64 BPM | SYSTOLIC BLOOD PRESSURE: 114 MMHG | RESPIRATION RATE: 20 BRPM

## 2018-06-03 VITALS
SYSTOLIC BLOOD PRESSURE: 135 MMHG | TEMPERATURE: 97.6 F | RESPIRATION RATE: 20 BRPM | OXYGEN SATURATION: 96 % | HEART RATE: 71 BPM | DIASTOLIC BLOOD PRESSURE: 81 MMHG

## 2018-06-03 VITALS
HEART RATE: 68 BPM | RESPIRATION RATE: 20 BRPM | DIASTOLIC BLOOD PRESSURE: 73 MMHG | OXYGEN SATURATION: 92 % | SYSTOLIC BLOOD PRESSURE: 129 MMHG | TEMPERATURE: 98.2 F

## 2018-06-03 VITALS
OXYGEN SATURATION: 93 % | HEART RATE: 82 BPM | SYSTOLIC BLOOD PRESSURE: 129 MMHG | TEMPERATURE: 98.3 F | RESPIRATION RATE: 20 BRPM | DIASTOLIC BLOOD PRESSURE: 61 MMHG

## 2018-06-03 LAB
BUN SERPL-MCNC: 13 MG/DL (ref 7–18)
CALCIUM SERPL-MCNC: 8.7 MG/DL (ref 8.5–10.1)
CHLORIDE SERPL-SCNC: 106 MEQ/L (ref 98–107)
CREAT SERPL-MCNC: 0.7 MG/DL (ref 0.5–1)
ERYTHROCYTE [DISTWIDTH] IN BLOOD BY AUTOMATED COUNT: 14.4 % (ref 11.6–17.2)
GFR SERPLBLD BASED ON 1.73 SQ M-ARVRAT: 81 ML/MIN (ref 89–?)
GLUCOSE SERPL-MCNC: 100 MG/DL (ref 74–106)
HCO3 BLD-SCNC: 28.5 MEQ/L (ref 21–32)
HCT VFR BLD CALC: 42.7 % (ref 35–46)
HGB BLD-MCNC: 14.1 GM/DL (ref 11.6–15.3)
MCH RBC QN AUTO: 31.2 PG (ref 27–34)
MCHC RBC AUTO-ENTMCNC: 33 % (ref 32–36)
MCV RBC AUTO: 94.4 FL (ref 80–100)
PLATELET # BLD: 157 TH/MM3 (ref 150–450)
PMV BLD AUTO: 8.4 FL (ref 7–11)
RBC # BLD AUTO: 4.53 MIL/MM3 (ref 4–5.3)
SODIUM SERPL-SCNC: 143 MEQ/L (ref 136–145)
WBC # BLD AUTO: 6.7 TH/MM3 (ref 4–11)

## 2018-06-03 RX ADMIN — APIXABAN SCH MG: 5 TABLET, FILM COATED ORAL at 10:15

## 2018-06-03 RX ADMIN — FUROSEMIDE SCH MG: 40 TABLET ORAL at 10:25

## 2018-06-03 RX ADMIN — FLUTICASONE PROPIONATE SCH SPRAY: 50 SPRAY, METERED NASAL at 09:00

## 2018-06-03 RX ADMIN — LORATADINE SCH MG: 10 TABLET ORAL at 10:16

## 2018-06-03 RX ADMIN — EPLERENONE SCH MG: 25 TABLET ORAL at 10:16

## 2018-06-03 RX ADMIN — POTASSIUM CHLORIDE SCH MEQ: 20 TABLET, EXTENDED RELEASE ORAL at 10:18

## 2018-06-03 RX ADMIN — CARVEDILOL SCH MG: 12.5 TABLET, FILM COATED ORAL at 21:07

## 2018-06-03 RX ADMIN — DOXYCYCLINE HYCLATE SCH MG: 100 CAPSULE, GELATIN COATED ORAL at 10:15

## 2018-06-03 RX ADMIN — CARVEDILOL SCH MG: 12.5 TABLET, FILM COATED ORAL at 10:16

## 2018-06-03 RX ADMIN — ASPIRIN 81 MG SCH MG: 81 TABLET ORAL at 10:16

## 2018-06-03 RX ADMIN — Medication SCH ML: at 21:07

## 2018-06-03 RX ADMIN — APIXABAN SCH MG: 5 TABLET, FILM COATED ORAL at 21:07

## 2018-06-03 RX ADMIN — CAPTOPRIL SCH MG: 50 TABLET ORAL at 21:07

## 2018-06-03 RX ADMIN — Medication SCH ML: at 10:16

## 2018-06-03 RX ADMIN — POTASSIUM CHLORIDE SCH MEQ: 20 TABLET, EXTENDED RELEASE ORAL at 21:00

## 2018-06-03 RX ADMIN — CAPTOPRIL SCH MG: 50 TABLET ORAL at 10:16

## 2018-06-03 NOTE — PD.CARD.PN
Subjective


Subjective Remarks


Feeling better.  Denies SOB or CP today.  Only complaint is congested sinuses.





Objective


Medications





Current Medications








 Medications


  (Trade)  Dose


 Ordered  Sig/Leanne


 Route  Start Time


 Stop Time Status Last Admin


 


  (NS Flush)  2 ml  BID


 IV FLUSH  6/2/18 09:00


    6/2/18 21:00


 


 


  (NS Flush)  2 ml  UNSCH  PRN


 IV FLUSH  6/2/18 09:00


     


 


 


  (Lasix Inj)  40 mg  BID@09,18


 IVP  6/2/18 09:00


    6/2/18 18:23


 


 


  (KCl)  20 meq  BID


 PO  6/2/18 09:00


    6/2/18 21:29


 


 


  (Eliquis)  5 mg  BID


 PO  6/2/18 21:00


    6/2/18 21:29


 


 


  (Aspirin Chew)  81 mg  DAILY


 CHEW  6/3/18 09:00


     


 


 


  (Coreg)  12.5 mg  BID


 PO  6/2/18 21:00


    6/2/18 21:29


 


 


  (Claritin)  10 mg  DAILY


 PO  6/3/18 09:00


     


 


 


  (Flonase Merritt Spr)  2 spray  DAILY


 EACH NARE  6/3/18 09:00


     


 


 


  (Capoten)  50 mg  BID


 PO  6/2/18 21:00


    6/2/18 21:29


 


 


  (Inspra)  25 mg  DAILY


 PO  6/3/18 09:00


     


 


 


  (Vibramycin)  100 mg  DAILY


 PO  6/3/18 09:00


     


 








Vital Signs / I&O





Vital Signs








  Date Time  Temp Pulse Resp B/P (MAP) Pulse Ox O2 Delivery O2 Flow Rate FiO2


 


6/3/18 04:00      Nasal Cannula 4.00 


 


6/3/18 04:00  75      


 


6/3/18 04:00 97.9 79 20 133/92 (106) 98   


 


6/3/18 00:00      Nasal Cannula 4.00 


 


6/3/18 00:00  71      


 


6/3/18 00:00 97.6 70 20 135/81 (99) 96   


 


6/2/18 20:00  87      


 


6/2/18 20:00      Nasal Cannula 4.00 


 


6/2/18 20:00 97.8 72 20 103/61 (75) 94   


 


6/2/18 16:00 98.4 78 20 136/80 (98) 94   


 


6/2/18 12:00 97.7 77 20 140/79 (99) 91   


 


6/2/18 12:00  87      


 


6/2/18 10:27      Nasal Cannula 4.00 


 


6/2/18 10:11        


 


6/2/18 10:07 97.2 71 20 115/69 (84) 92   


 


6/2/18 08:08  96 24 149/67 (94) 93 BiPAP  














I/O      


 


 6/2/18 6/2/18 6/2/18 6/3/18 6/3/18 6/3/18





 07:00 15:00 23:00 07:00 15:00 23:00


 


Intake Total   480 ml 520 ml  


 


Output Total  300 ml    


 


Balance  -300 ml 480 ml 520 ml  


 


      


 


Intake Oral   480 ml 520 ml  


 


Output Urine Total  300 ml    


 


# Voids  2 4 3  


 


# Bowel Movements   0 1  








Physical Exam


VSS, afebrile


No JVD @ 90 deg.


Lungs: CTA


Heart: Reg, s1, s2, 1/6 systolic murmur at base.


Ext: No C/C/E


Neuro: Non-focal


Laboratory





Laboratory Tests








Test


  6/2/18


13:02 6/2/18


19:11


 


Troponin I 0.26 NG/ML  0.25 NG/ML 


 


Total Creatine Kinase  124 U/L 








Imaging





Last 48 hours Impressions








Chest X-Ray 6/2/18 0000 Signed





Impressions: 





 CONCLUSION: 





 Stable appearance of emphysema and bilateral basilar atelectasis.





  





 











Assessment and Plan


Assessment and Plan


1.  Acute on chronic systolic congestive heart failure.  Inproving.  Change IV 

lasix to 40 PO daily today


2.  Atherosclerotic heart disease with elevated troponin I  indeterminate and 

not trending up.  Likely due to exacerbation CHF.


3.  Hypertensive heart disease.


4.  Intolerances to multiple medications.


5.  Moderate left ventricular dysfunction, status post CRT-D therapy. 


Functioning normally on recent interrogation.


6.  Paroxysmal atrial fibrillation, currently in AV paced rhythm.


7.  History of chronic left bundle branch block.


Code Status


Full


Discussed Condition With


Discussed plans with patient.


Dr. Akbar's service [facility] doctor will see on Monday.











Robert De Los Santos MD Benny 3, 2018 07:34

## 2018-06-03 NOTE — EKG
Date Performed: 06/02/2018       Time Performed: 18:58:00

 

PTAGE:      78 years

 

EKG:      ELECTRONIC VENTRICULAR PACEMAKER ABNORMAL RHYTHM ECG

 

PREVIOUS TRACING       : 06/02/2018 06.43 Occassional PVC present. Since the previous tracing, no sig
nificant change noted.

 

DOCTOR:   Brien Craven  Interpretating Date/Time  06/03/2018 15:42:57

## 2018-06-03 NOTE — HHI.FPPN
Subjective


Remarks


No acute issues overnight. Vitals are stable, patient remains afebrile. Her O2 

saturation is now 95% on RA and she denies any chest pain or shortness of 

breath.  She continues to feel fatigued, but notes overall improvement in 

symptoms.  She is tolerating PO. She has had 9 voids in the past 24 hours.  


 (Ebonie Campo MD R3)





Objective


Vitals





Vital Signs








  Date Time  Temp Pulse Resp B/P (MAP) Pulse Ox O2 Delivery O2 Flow Rate FiO2


 


6/3/18 04:00      Nasal Cannula 4.00 


 


6/3/18 04:00  75      


 


6/3/18 04:00 97.9 79 20 133/92 (106) 98   


 


6/3/18 00:00      Nasal Cannula 4.00 


 


6/3/18 00:00  71      


 


6/3/18 00:00 97.6 70 20 135/81 (99) 96   


 


6/2/18 20:00  87      


 


6/2/18 20:00      Nasal Cannula 4.00 


 


6/2/18 20:00 97.8 72 20 103/61 (75) 94   


 


6/2/18 16:00 98.4 78 20 136/80 (98) 94   


 


6/2/18 12:00 97.7 77 20 140/79 (99) 91   


 


6/2/18 12:00  87      


 


6/2/18 10:27      Nasal Cannula 4.00 


 


6/2/18 10:11        


 


6/2/18 10:07 97.2 71 20 115/69 (84) 92   














I/O      


 


 6/2/18 6/2/18 6/2/18 6/3/18 6/3/18 6/3/18





 07:00 15:00 23:00 07:00 15:00 23:00


 


Intake Total   480 ml 520 ml  


 


Output Total  300 ml    


 


Balance  -300 ml 480 ml 520 ml  


 


      


 


Intake Oral   480 ml 520 ml  


 


Output Urine Total  300 ml    


 


# Voids  2 4 3  


 


# Bowel Movements   0 1  








 (Ebonie Campo MD R3)


Result Diagram:  


6/3/18 0700                                                                    

            6/3/18 0700





Imaging





Last Impressions








Chest X-Ray 6/2/18 0000 Signed





Impressions: 





 CONCLUSION: 





 Stable appearance of emphysema and bilateral basilar atelectasis.





  





 








Objective Remarks


GENERAL: Well-nourished, well-developed  female patient in no acute 

distress.


SKIN: Warm and dry.


HEAD: Normocephalic.


EYES: No scleral icterus. No injection or drainage. 


NECK: Supple, trachea midline. No JVD or lymphadenopathy.


CARDIOVASCULAR: Regular rate and rhythm without murmurs, gallops, or rubs. 


RESPIRATORY: Breath sounds equal bilaterally. No accessory muscle use.


GASTROINTESTINAL: Abdomen soft, non-tender, nondistended. 


MUSCULOSKELETAL: No cyanosis, or edema. 


BACK: Nontender without obvious deformity. 





 (Ebonie Campo MD R3)





A/P


Assessment and Plan


78-year-old female with past history of myocardial infarction 2, CABG, A. fib, 

hypertension, CHF who presented for shortness of breath and was admitted for 

acute on chronic CHF exacerbation.


Discharge Planning


Anticipate discharge home tomorrow.


 (Ebonie Campo MD R3)


Attending Attestation


Patient seen, examined, and discussed with resident team. I agree with 

assessment and management as documented and discussed with me.





Pt reports breathing is better.


She is tolerating Inspra. 


Wean off O2 as able - 95% on room air at the time of interview/exam this 

morning.


Apprecaite cardiology.


 (Orly Ceja MD)


Problem List:  


(1) Acute on chronic systolic congestive heart failure


ICD Codes:  I50.23 - Acute on chronic systolic congestive heart failure


Status:  Acute


Plan:  Improving. Now tolerating RA. BNP trending down.


Troponin on admission 0.31, stable overnight. 


Known to cardiologist Dr. Akbar. Echocardiogram in 2013 shows EF 30-35%





-Supplemental O2 PRN


-Transition from Lasix 40 mg IV twice daily to 40mg PO daily


-Cardiology consulted- appreciate recommendations





(2) Afib


ICD Codes:  I48.91 - Unspecified atrial fibrillation


Plan:  History of A. fib.  Reports many recent events of A. fib when having her 

pacemaker examined this past week.  





-Continue current outpatient meds





(3) Hypertension


ICD Codes:  I10 - Hypertension


Status:  Chronic


Plan:  Stable.





-Continue home medications





(4) Parotid gland fullness


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


Status:  Chronic


Plan:  History of right parotid gland scarring. On Doxycycline daily outpatient.





-Continue home medications





(5) FEN


Status:  Acute


Plan:  Fluids:


-Tolerating p.o.


Electrolytes:


-Monitor and replete as needed


Nutrition:


-Heart Healthy Diet


PPx:


-On Eliquis


 (Ebonie Campo MD R3)





Problem Qualifiers





(1) Afib:  


Qualified Codes:  I48.2 - Chronic atrial fibrillation


(2) Hypertension:  


Qualified Codes:  I10 - Essential (primary) hypertension








Ebonie Campo MD R3 Benny 3, 2018 10:00


Orly Ceja MD Benny 3, 2018 14:42

## 2018-06-04 VITALS
DIASTOLIC BLOOD PRESSURE: 68 MMHG | RESPIRATION RATE: 16 BRPM | OXYGEN SATURATION: 96 % | HEART RATE: 77 BPM | TEMPERATURE: 97.9 F | SYSTOLIC BLOOD PRESSURE: 126 MMHG

## 2018-06-04 VITALS
RESPIRATION RATE: 18 BRPM | TEMPERATURE: 98.1 F | SYSTOLIC BLOOD PRESSURE: 125 MMHG | DIASTOLIC BLOOD PRESSURE: 66 MMHG | HEART RATE: 65 BPM | OXYGEN SATURATION: 97 %

## 2018-06-04 VITALS
OXYGEN SATURATION: 97 % | DIASTOLIC BLOOD PRESSURE: 78 MMHG | TEMPERATURE: 98.4 F | HEART RATE: 77 BPM | SYSTOLIC BLOOD PRESSURE: 165 MMHG | RESPIRATION RATE: 18 BRPM

## 2018-06-04 VITALS — HEART RATE: 82 BPM

## 2018-06-04 VITALS
DIASTOLIC BLOOD PRESSURE: 61 MMHG | HEART RATE: 71 BPM | OXYGEN SATURATION: 97 % | TEMPERATURE: 97.3 F | RESPIRATION RATE: 18 BRPM | SYSTOLIC BLOOD PRESSURE: 145 MMHG

## 2018-06-04 RX ADMIN — FUROSEMIDE SCH MG: 40 TABLET ORAL at 10:11

## 2018-06-04 RX ADMIN — FLUTICASONE PROPIONATE SCH SPRAY: 50 SPRAY, METERED NASAL at 09:00

## 2018-06-04 RX ADMIN — DOXYCYCLINE HYCLATE SCH MG: 100 CAPSULE, GELATIN COATED ORAL at 10:11

## 2018-06-04 RX ADMIN — CAPTOPRIL SCH MG: 50 TABLET ORAL at 10:11

## 2018-06-04 RX ADMIN — ASPIRIN 81 MG SCH MG: 81 TABLET ORAL at 10:11

## 2018-06-04 RX ADMIN — POTASSIUM CHLORIDE SCH MEQ: 20 TABLET, EXTENDED RELEASE ORAL at 09:00

## 2018-06-04 RX ADMIN — APIXABAN SCH MG: 5 TABLET, FILM COATED ORAL at 10:11

## 2018-06-04 RX ADMIN — LORATADINE SCH MG: 10 TABLET ORAL at 10:10

## 2018-06-04 RX ADMIN — Medication SCH ML: at 09:00

## 2018-06-04 RX ADMIN — EPLERENONE SCH MG: 25 TABLET ORAL at 10:11

## 2018-06-04 RX ADMIN — CARVEDILOL SCH MG: 12.5 TABLET, FILM COATED ORAL at 10:11

## 2018-06-04 NOTE — HHI.DCPOC
Discharge Care Plan


Diagnosis:  


(1) Acute on chronic systolic congestive heart failure


(2) Elevated troponin I level


(3) Coronary artery disease


(4) Afib


Goals to Promote Your Health


* To prevent worsening of your condition and complications


* To maintain your health at the optimal level


Directions to Meet Your Goals


*** Take your medications as prescribed


*** Follow your dietary instruction


*** Follow activity as directed








*** Keep your appointments as scheduled


*** Take your immunizations and boosters as scheduled


*** If your symptoms worsen call your PCP, if no PCP go to Urgent Care Center 

or Emergency Room***


*** Smoking is Dangerous to Your Health. Avoid second hand smoke***


***Call the 24-hour hour crisis hotline for domestic abuse at 1-755.567.8402***











Van Benitez MD R2 Jun 4, 2018 11:38

## 2018-06-04 NOTE — HHI.FPPN
Subjective


Remarks


79 yo female with CHF admitted for SOB from pulmonary edema now being seen for 

follow up. Today she feels back to normal. No CP/SOB. Off oxygen and 

comfortable. No swelling of the legs. 


 (Van Benitez MD R2)





Objective


Vitals





Vital Signs








  Date Time  Temp Pulse Resp B/P (MAP) Pulse Ox O2 Delivery O2 Flow Rate FiO2


 


6/4/18 08:06 98.4 77 18 165/78 (107) 97   


 


6/4/18 08:00  82      


 


6/4/18 08:00      Nasal Cannula 4.00 


 


6/4/18 04:00      Nasal Cannula 4.00 


 


6/4/18 04:00 97.3 71 18 145/61 (89) 97   


 


6/4/18 04:00  64      


 


6/4/18 00:00      Nasal Cannula 4.00 


 


6/4/18 00:00  65      


 


6/4/18 00:00 98.1 71 18 125/66 (85) 97   


 


6/3/18 20:00 98.3 79 20 129/61 (83) 93   


 


6/3/18 20:00  82      


 


6/3/18 20:00      Nasal Cannula 4.00 


 


6/3/18 16:00  68      


 


6/3/18 16:00 98.2 68 20 129/73 (91) 92   


 


6/3/18 12:00 97.8 64 20 114/62 (79) 93   


 


6/3/18 12:00  61      














I/O      


 


 6/3/18 6/3/18 6/3/18 6/4/18 6/4/18 6/4/18





 06:59 14:59 22:59 06:59 14:59 22:59


 


Intake Total 520 ml  600 ml   


 


Balance 520 ml  600 ml   


 


      


 


Intake Oral 520 ml  600 ml   


 


# Voids 3  6   


 


# Bowel Movements 1  2   








 (Van Benitez MD R2)


Result Diagram:  


6/3/18 0700                                                                    

            6/3/18 0700





Imaging





Last Impressions








Chest X-Ray 6/2/18 0000 Signed





Impressions: 





 CONCLUSION: 





 Stable appearance of emphysema and bilateral basilar atelectasis.





  





 








Objective Remarks


GENERAL: Well-nourished, well-developed  female patient in no acute 

distress.


SKIN: Warm and dry.


NECK: Supple, trachea midline. No JVD or lymphadenopathy.


CARDIOVASCULAR: Regular rate and rhythm without murmurs, gallops, or rubs. 


RESPIRATORY: CTAB. No crackles or wheezes. No accessory muscle use.


GASTROINTESTINAL: Abdomen soft, non-tender, nondistended. 


MUSCULOSKELETAL: No cyanosis or edema.


Medications and IVs





Current Medications








 Medications


  (Trade)  Dose


 Ordered  Sig/Leanne


 Route  Start Time


 Stop Time Status Last Admin


 


  (NS Flush)  2 ml  BID


 IV FLUSH  6/2/18 09:00


    6/3/18 21:07


 


 


  (NS Flush)  2 ml  UNSCH  PRN


 IV FLUSH  6/2/18 09:00


     


 


 


  (KCl)  20 meq  BID


 PO  6/2/18 09:00


    6/3/18 10:18


 


 


  (Eliquis)  5 mg  BID


 PO  6/2/18 21:00


    6/4/18 10:11


 


 


  (Aspirin Chew)  81 mg  DAILY


 CHEW  6/3/18 09:00


    6/4/18 10:11


 


 


  (Coreg)  12.5 mg  BID


 PO  6/2/18 21:00


    6/4/18 10:11


 


 


  (Claritin)  10 mg  DAILY


 PO  6/3/18 09:00


    6/4/18 10:10


 


 


  (Flonase Merritt Spr)  2 spray  DAILY


 EACH NARE  6/3/18 09:00


     


 


 


  (Capoten)  50 mg  BID


 PO  6/2/18 21:00


    6/4/18 10:11


 


 


  (Inspra)  25 mg  DAILY


 PO  6/3/18 09:00


    6/4/18 10:11


 


 


  (Vibramycin)  100 mg  DAILY


 PO  6/3/18 09:00


    6/4/18 10:11


 


 


  (Lasix)  40 mg  DAILY


 PO  6/3/18 09:15


    6/4/18 10:11


 








 (Van Benitez MD R2)





A/P


Assessment and Plan


78-year-old female with past history of myocardial infarction 2, CABG, A. fib, 

hypertension, CHF presenting with:


 (Van Benitez MD R2)


Attending Attestation


Patient seen and examined, discussed with resident team. I agree with 

assessment and management as documented and discussed with me.





Pt without complaints.


Lungs CTAB. Maintaining sats on room air.


Discharge home today.


 (Orly Ceja MD)


Problem List:  


(1) Acute on chronic systolic congestive heart failure


ICD Codes:  I50.23 - Acute on chronic systolic congestive heart failure


Status:  Resolved


Plan:  Resolved. Now tolerating RA. BNP trending down.


Troponin on admission 0.31, stable since


Known to cardiologist Dr. Akbar. Echocardiogram in 2013 shows EF 30-35%





-Home O2 walk test


-Lasix 40mg PO daily


-Cardiology consulted- appreciate recommendations


   -To evaluate patient today for probable d/c


   -Started eplerenone; will check BMP on discharge





(2) Afib


ICD Codes:  I48.91 - Unspecified atrial fibrillation


Status:  Chronic


Plan:  Rate at goal


History of A. fib. Reports many recent events of A. fib when having her 

pacemaker examined this past week.  





-Continue current outpatient meds





(3) Hypertension


ICD Codes:  I10 - Hypertension


Status:  Chronic


Plan:  Stable.





-Continue home medications





(4) Parotid gland fullness


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


Status:  Chronic


Plan:  History of right parotid gland scarring. On Doxycycline daily outpatient.





-Continue home medications





(5) FEN


Status:  Acute


Plan:  Fluids:


-Tolerating p.o.





Electrolytes:


-Monitor and replete as needed





Nutrition:


-Heart Healthy Diet





PPx:


-On Eliquis





Dispo: Home today pending cardiology clearance


 (Van Benitez MD R2)





Problem Qualifiers





(1) Afib:  


Qualified Codes:  I48.2 - Chronic atrial fibrillation


(2) Hypertension:  


Qualified Codes:  I10 - Essential (primary) hypertension








Van Benitez MD R2 Jun 4, 2018 11:33


Orly Ceja MD Jun 4, 2018 20:32

## 2018-06-04 NOTE — PD.CARD.PN
Subjective


Subjective Remarks


No CP or edema, SOB improved, feels much better





Objective


Medications





Current Medications








 Medications


  (Trade)  Dose


 Ordered  Sig/Leanne


 Route  Start Time


 Stop Time Status Last Admin


 


  (NS Flush)  2 ml  BID


 IV FLUSH  6/2/18 09:00


    6/3/18 21:07


 


 


  (NS Flush)  2 ml  UNSCH  PRN


 IV FLUSH  6/2/18 09:00


     


 


 


  (KCl)  20 meq  BID


 PO  6/2/18 09:00


    6/3/18 10:18


 


 


  (Eliquis)  5 mg  BID


 PO  6/2/18 21:00


    6/4/18 10:11


 


 


  (Aspirin Chew)  81 mg  DAILY


 CHEW  6/3/18 09:00


    6/4/18 10:11


 


 


  (Coreg)  12.5 mg  BID


 PO  6/2/18 21:00


    6/4/18 10:11


 


 


  (Claritin)  10 mg  DAILY


 PO  6/3/18 09:00


    6/4/18 10:10


 


 


  (Flonase Merritt Spr)  2 spray  DAILY


 EACH NARE  6/3/18 09:00


     


 


 


  (Capoten)  50 mg  BID


 PO  6/2/18 21:00


    6/4/18 10:11


 


 


  (Inspra)  25 mg  DAILY


 PO  6/3/18 09:00


    6/4/18 10:11


 


 


  (Vibramycin)  100 mg  DAILY


 PO  6/3/18 09:00


    6/4/18 10:11


 


 


  (Lasix)  40 mg  DAILY


 PO  6/3/18 09:15


    6/4/18 10:11


 








Vital Signs / I&O





Vital Signs








  Date Time  Temp Pulse Resp B/P (MAP) Pulse Ox O2 Delivery O2 Flow Rate FiO2


 


6/4/18 08:06 98.4 77 18 165/78 (107) 97   


 


6/4/18 08:00  82      


 


6/4/18 08:00      Nasal Cannula 4.00 


 


6/4/18 04:00      Nasal Cannula 4.00 


 


6/4/18 04:00 97.3 71 18 145/61 (89) 97   


 


6/4/18 04:00  64      


 


6/4/18 00:00      Nasal Cannula 4.00 


 


6/4/18 00:00  65      


 


6/4/18 00:00 98.1 71 18 125/66 (85) 97   


 


6/3/18 20:00 98.3 79 20 129/61 (83) 93   


 


6/3/18 20:00  82      


 


6/3/18 20:00      Nasal Cannula 4.00 


 


6/3/18 16:00  68      


 


6/3/18 16:00 98.2 68 20 129/73 (91) 92   














I/O      


 


 6/3/18 6/3/18 6/3/18 6/4/18 6/4/18 6/4/18





 07:00 15:00 23:00 07:00 15:00 23:00


 


Intake Total 520 ml  600 ml   


 


Balance 520 ml  600 ml   


 


      


 


Intake Oral 520 ml  600 ml   


 


# Voids 3  6   


 


# Bowel Movements 1  2   








Physical Exam


GENERAL: In NAD


SKIN: Warm and dry.


HEAD: Normocephalic.


EYES: No scleral icterus. No injection or drainage. 


NECK: Supple, trachea midline. No JVD or lymphadenopathy.


CARDIOVASCULAR: Regular rate and rhythm with 1/6 syst murmur, no gallops or 

rubs. 


RESPIRATORY: Breath sounds equal bilaterally. No accessory muscle use.


GASTROINTESTINAL: Abdomen soft, non-tender, nondistended. 


MUSCULOSKELETAL: No cyanosis, or edema.








Assessment and Plan


Problem List:  


(1) Acute on chronic systolic congestive heart failure


ICD Codes:  I50.23 - Acute on chronic systolic congestive heart failure


Status:  Resolved


(2) Coronary artery disease


ICD Codes:  I25.10 - Coronary artery disease


Status:  Chronic


(3) Elevated troponin I level


ICD Codes:  R79.89 - Elevated troponin I level


Status:  Acute


(4) Afib


ICD Codes:  I48.91 - Unspecified atrial fibrillation


Status:  Chronic


(5) Hypertension


ICD Codes:  I10 - Hypertension


Status:  Chronic


(6) ICD (implantable cardioverter-defibrillator) in place


ICD Codes:  Z95.810 - Presence of automatic (implantable) cardiac defibrillator


Assessment and Plan


CHF improved with diuresis. Continue current program with furosemide. Continue 

GDMT for CHF. No evidence of ACS. BP control. Increase activity. OK to DC home 

as planned. F/u w Dr. Akbar within 1 week as scheduled.





Problem Qualifiers





(1) Afib:  


Qualified Codes:  I48.2 - Chronic atrial fibrillation


(2) Hypertension:  


Qualified Codes:  I10 - Essential (primary) hypertension








Tanisha Beltrán MD Jun 4, 2018 13:45

## 2018-06-05 NOTE — HHI.DS
Discharge Summary


Admission Date


Jun 2, 2018 at 9:00 am


Discharge Date:  Jun 4, 2018


Admitting Diagnosis





Acute on chronic CHF exacerbation, elevated troponin





(1) Acute on chronic systolic congestive heart failure


Diagnosis:  Principal


ICD Codes:  I50.23 - Acute on chronic systolic congestive heart failure


Status:  Resolved


(2) Afib


Diagnosis:  Secondary


ICD Codes:  I48.91 - Unspecified atrial fibrillation


Status:  Chronic


(3) Hypertension


Diagnosis:  Secondary


ICD Codes:  I10 - Hypertension


Status:  Chronic


(4) Parotid gland fullness


Diagnosis:  Secondary


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


Status:  Chronic


Consultants


Cardiology - Miranda Beltrán and Helene


Brief History


Patient is a 78-year-old female with past history of myocardial infarction 2, 

CABG, A. fib, hypertension, CHF who presents today for shortness of breath.  

She reports that for the past week she has had increasing shortness of breath.  

It was mildly improved by increasing her daily Coreg.  She was taking 12.5 in 

the morning and 18.75 at night, she increased it to 12.5 in the morning and 25 

at night.  This improved her breathing for traumatic time, however this morning 

her breathing was worse and she believed she needed to go to the hospital.  She 

states it feels similar to the last time she had flash pulmonary edema in 2014.

  She also reports a cramps in her legs and feet this past week, however 

attributes it to a recent calorie restricted diet she is on.  She increased her 

potassium intake and the cramps resolved.  She denies redness, swelling of her 

legs, immobilization or surgery in the past 4 weeks, previous PE or DVT, 

hemoptysis, previous malignancy.  She states she went to her cardiologist, Dr. Akbar, and Thursday to check her pacemaker.  She reports there was over 100 

A. fib events over the last week.  She did note some occasional palpitations, 

however usually they are subtle.  She reports a 1 pacemaker defibrillation in 

the past, however none recently.  She reports she has dyspnea on exertion.  

While in the ED she reports her breathing has improved on BiPAP.  States she 

does not use CPAP at night.  Denies chest pain, headache, change in vision, 

nausea, vomiting, fever, chills, abdominal pain, change in bowel or bladder 

habits.  No other complaints today.





flash edema, since 2013,2014. Problems breathing for passed week, upped coreg. 

Started 12.5 am nd 25 pm. cut the night down to 18. went back 25 last week. and 

felt better. Cramps in legs and feet. Increased potassium because on low aldo 

diet, cramps went away. Sinus and allergy problems. [facility] office last 

Thursday for pacer check. over 100 afib over last week. No CPAP at night. MIGUEL 

worsening. Pacer for "bad heart" defib went off 1 time in the past, none 

recently. 





-HA, vision, chest pain, abd, n/v/f/c diarrhea const, 





SOb better now,


CBC/BMP:  


6/3/18 0700                                                                    

            6/3/18 0700





Significant Findings





Laboratory Tests








Test


  6/2/18


13:02 6/2/18


19:11 6/3/18


07:00


 


Troponin I


  0.26 NG/ML


(0.02-0.05) 0.25 NG/ML


(0.02-0.05) 


 


 


Estimat Glomerular Filtration


Rate 


  


  81 ML/MIN


(>89)


 


B-Type Natriuretic Peptide


  


  


  363 PG/ML


(0-100)








Imaging





Last Impressions








Chest X-Ray 6/2/18 0000 Signed





Impressions: 





 CONCLUSION: 





 Stable appearance of emphysema and bilateral basilar atelectasis.





  





 








PE at Discharge


GENERAL: Well-nourished, well-developed  female patient in no acute 

distress.


SKIN: Warm and dry.


NECK: Supple, trachea midline. No JVD or lymphadenopathy.


CARDIOVASCULAR: Regular rate and rhythm without murmurs, gallops, or rubs. 


RESPIRATORY: CTAB. No crackles or wheezes. No accessory muscle use.


GASTROINTESTINAL: Abdomen soft, non-tender, nondistended. 


MUSCULOSKELETAL: No cyanosis or edema.


Hospital Course


77 yo with AFib, CHF admitted for respiratory distress secondary to pulmonary 

edema from CHF. Her respiratory status improved with aggressive diuresis and 

sodium restriction / fluid restriction. After two days of treatment she was 

back to baseline. Eplerenone was added to her medication regimen during 

hospital stay. She also had ACS work up which was negative. She was cleared for 

discharge with outpatient cardiology follow up.


Pt Condition on Discharge:  Stable


Discharge Disposition:  Discharge Home


Discharge Instructions


DIET: Follow Instructions for:  Heart Healthy Diet


Activities you can perform:  Regular-No Restrictions


Follow up Referrals:  


Cardiology - 1 Week with Ramu Akbar MD





New Orders:  


BASIC METABOLIC PROF - 2 Weeks





New Medications:  


Eplerenone (Eplerenone) 25 Mg Tab


25 MG PO DAILY, #30 TAB 1 Refill





 


Continued Medications:  


Apixaban (Eliquis) 5 Mg Tab


5 MG PO BID for Blood Clot Prevention, #60 TAB 0 Refills





Aspirin (Aspirin) 81 Mg Chew


81 MG CHEW DAILY, TAB 0 Refills





Carvedilol (Coreg) 12.5 Mg Tab


12.5 MG PO BID, #60 TAB 0 Refills





Doxycycline (Monohydrate) (Doxycycline) 100 Mg Cap








Fluticasone Nasal Spray (Flonase Nasal Spray) 50 Mcg/Act Spray


100 MCG EACH NARE BID for Allergies, #1 BOTTLE 0 Refills





Furosemide (Lasix) 40 Mg Tab


40 MG PO DAILY, #30 TAB 0 Refills





Loratadine (Claritin) 10 Mg Cap


10 MG PO DAILY for Allergy Management, CAP 0 Refills

















Van Benitez MD R2 Jun 5, 2018 12:08 pm

## 2018-06-25 ENCOUNTER — HOSPITAL ENCOUNTER (INPATIENT)
Dept: HOSPITAL 17 - NEPC | Age: 78
LOS: 1 days | Discharge: HOME | DRG: 293 | End: 2018-06-26
Attending: HOSPITALIST | Admitting: HOSPITALIST
Payer: MEDICARE

## 2018-06-25 VITALS — OXYGEN SATURATION: 90 %

## 2018-06-25 VITALS — HEART RATE: 82 BPM

## 2018-06-25 VITALS — OXYGEN SATURATION: 94 %

## 2018-06-25 VITALS
HEART RATE: 70 BPM | RESPIRATION RATE: 18 BRPM | OXYGEN SATURATION: 95 % | SYSTOLIC BLOOD PRESSURE: 135 MMHG | DIASTOLIC BLOOD PRESSURE: 71 MMHG

## 2018-06-25 VITALS
HEART RATE: 76 BPM | SYSTOLIC BLOOD PRESSURE: 128 MMHG | RESPIRATION RATE: 18 BRPM | TEMPERATURE: 98.1 F | OXYGEN SATURATION: 95 % | DIASTOLIC BLOOD PRESSURE: 81 MMHG

## 2018-06-25 VITALS
DIASTOLIC BLOOD PRESSURE: 58 MMHG | TEMPERATURE: 98.3 F | OXYGEN SATURATION: 92 % | HEART RATE: 69 BPM | SYSTOLIC BLOOD PRESSURE: 117 MMHG

## 2018-06-25 VITALS
DIASTOLIC BLOOD PRESSURE: 110 MMHG | RESPIRATION RATE: 18 BRPM | OXYGEN SATURATION: 92 % | SYSTOLIC BLOOD PRESSURE: 189 MMHG | TEMPERATURE: 98.6 F | HEART RATE: 110 BPM

## 2018-06-25 VITALS — HEART RATE: 80 BPM

## 2018-06-25 VITALS — HEART RATE: 84 BPM

## 2018-06-25 VITALS — HEART RATE: 65 BPM

## 2018-06-25 VITALS — HEIGHT: 62 IN | WEIGHT: 176.37 LBS | BODY MASS INDEX: 32.46 KG/M2

## 2018-06-25 VITALS — HEART RATE: 62 BPM

## 2018-06-25 VITALS — HEART RATE: 75 BPM

## 2018-06-25 VITALS — HEART RATE: 77 BPM

## 2018-06-25 VITALS — HEART RATE: 63 BPM

## 2018-06-25 VITALS — HEART RATE: 86 BPM

## 2018-06-25 VITALS — HEART RATE: 88 BPM

## 2018-06-25 DIAGNOSIS — Z87.891: ICD-10-CM

## 2018-06-25 DIAGNOSIS — I48.2: ICD-10-CM

## 2018-06-25 DIAGNOSIS — I11.0: Primary | ICD-10-CM

## 2018-06-25 DIAGNOSIS — I25.5: ICD-10-CM

## 2018-06-25 DIAGNOSIS — I50.23: ICD-10-CM

## 2018-06-25 DIAGNOSIS — Z79.01: ICD-10-CM

## 2018-06-25 DIAGNOSIS — Z85.828: ICD-10-CM

## 2018-06-25 DIAGNOSIS — E78.5: ICD-10-CM

## 2018-06-25 DIAGNOSIS — Z95.810: ICD-10-CM

## 2018-06-25 DIAGNOSIS — R74.8: ICD-10-CM

## 2018-06-25 DIAGNOSIS — Z95.1: ICD-10-CM

## 2018-06-25 DIAGNOSIS — I25.10: ICD-10-CM

## 2018-06-25 DIAGNOSIS — Z79.82: ICD-10-CM

## 2018-06-25 DIAGNOSIS — Z82.3: ICD-10-CM

## 2018-06-25 LAB
ALBUMIN SERPL-MCNC: 3.5 GM/DL (ref 3.4–5)
ALP SERPL-CCNC: 95 U/L (ref 45–117)
ALT SERPL-CCNC: 32 U/L (ref 10–53)
AST SERPL-CCNC: 39 U/L (ref 15–37)
BASOPHILS # BLD AUTO: 0 TH/MM3 (ref 0–0.2)
BASOPHILS NFR BLD: 0.2 % (ref 0–2)
BILIRUB SERPL-MCNC: 0.5 MG/DL (ref 0.2–1)
BUN SERPL-MCNC: 16 MG/DL (ref 7–18)
CALCIUM SERPL-MCNC: 8.7 MG/DL (ref 8.5–10.1)
CHLORIDE SERPL-SCNC: 106 MEQ/L (ref 98–107)
CREAT SERPL-MCNC: 0.97 MG/DL (ref 0.5–1)
EOSINOPHIL # BLD: 0.1 TH/MM3 (ref 0–0.4)
EOSINOPHIL NFR BLD: 1 % (ref 0–4)
ERYTHROCYTE [DISTWIDTH] IN BLOOD BY AUTOMATED COUNT: 14.1 % (ref 11.6–17.2)
GFR SERPLBLD BASED ON 1.73 SQ M-ARVRAT: 56 ML/MIN (ref 89–?)
GLUCOSE SERPL-MCNC: 130 MG/DL (ref 74–106)
HCO3 BLD-SCNC: 23.7 MEQ/L (ref 21–32)
HCT VFR BLD CALC: 46.5 % (ref 35–46)
HGB BLD-MCNC: 15.6 GM/DL (ref 11.6–15.3)
INR PPP: 1.1 RATIO
LYMPHOCYTES # BLD AUTO: 1.5 TH/MM3 (ref 1–4.8)
LYMPHOCYTES NFR BLD AUTO: 13.4 % (ref 9–44)
MAGNESIUM SERPL-MCNC: 2 MG/DL (ref 1.5–2.5)
MCH RBC QN AUTO: 31.4 PG (ref 27–34)
MCHC RBC AUTO-ENTMCNC: 33.6 % (ref 32–36)
MCV RBC AUTO: 93.6 FL (ref 80–100)
MONOCYTE #: 0.6 TH/MM3 (ref 0–0.9)
MONOCYTES NFR BLD: 5.3 % (ref 0–8)
NEUTROPHILS # BLD AUTO: 8.9 TH/MM3 (ref 1.8–7.7)
NEUTROPHILS NFR BLD AUTO: 80.1 % (ref 16–70)
PLATELET # BLD: 183 TH/MM3 (ref 150–450)
PMV BLD AUTO: 8.4 FL (ref 7–11)
PROT SERPL-MCNC: 7.2 GM/DL (ref 6.4–8.2)
PROTHROMBIN TIME: 10.7 SEC (ref 9.8–11.6)
RBC # BLD AUTO: 4.96 MIL/MM3 (ref 4–5.3)
SODIUM SERPL-SCNC: 141 MEQ/L (ref 136–145)
TROPONIN I SERPL-MCNC: 0.29 NG/ML (ref 0.02–0.05)
TROPONIN I SERPL-MCNC: 0.58 NG/ML (ref 0.02–0.05)
TROPONIN I SERPL-MCNC: 0.64 NG/ML (ref 0.02–0.05)
WBC # BLD AUTO: 11.1 TH/MM3 (ref 4–11)

## 2018-06-25 PROCEDURE — 80053 COMPREHEN METABOLIC PANEL: CPT

## 2018-06-25 PROCEDURE — 80048 BASIC METABOLIC PNL TOTAL CA: CPT

## 2018-06-25 PROCEDURE — 83880 ASSAY OF NATRIURETIC PEPTIDE: CPT

## 2018-06-25 PROCEDURE — 71046 X-RAY EXAM CHEST 2 VIEWS: CPT

## 2018-06-25 PROCEDURE — 83735 ASSAY OF MAGNESIUM: CPT

## 2018-06-25 PROCEDURE — 85025 COMPLETE CBC W/AUTO DIFF WBC: CPT

## 2018-06-25 PROCEDURE — 84484 ASSAY OF TROPONIN QUANT: CPT

## 2018-06-25 PROCEDURE — 82552 ASSAY OF CPK IN BLOOD: CPT

## 2018-06-25 PROCEDURE — 93005 ELECTROCARDIOGRAM TRACING: CPT

## 2018-06-25 PROCEDURE — 82550 ASSAY OF CK (CPK): CPT

## 2018-06-25 PROCEDURE — 85730 THROMBOPLASTIN TIME PARTIAL: CPT

## 2018-06-25 PROCEDURE — 93306 TTE W/DOPPLER COMPLETE: CPT

## 2018-06-25 PROCEDURE — 85610 PROTHROMBIN TIME: CPT

## 2018-06-25 RX ADMIN — APIXABAN SCH MG: 5 TABLET, FILM COATED ORAL at 20:28

## 2018-06-25 RX ADMIN — CAPTOPRIL SCH MG: 50 TABLET ORAL at 20:28

## 2018-06-25 RX ADMIN — FUROSEMIDE SCH MG: 10 INJECTION, SOLUTION INTRAMUSCULAR; INTRAVENOUS at 17:15

## 2018-06-25 RX ADMIN — CARVEDILOL SCH MG: 12.5 TABLET, FILM COATED ORAL at 20:28

## 2018-06-25 RX ADMIN — POTASSIUM CHLORIDE SCH MEQ: 20 TABLET, EXTENDED RELEASE ORAL at 12:07

## 2018-06-25 RX ADMIN — Medication SCH ML: at 20:28

## 2018-06-25 RX ADMIN — STANDARDIZED SENNA CONCENTRATE AND DOCUSATE SODIUM SCH TAB: 8.6; 5 TABLET, FILM COATED ORAL at 21:00

## 2018-06-25 RX ADMIN — FLUTICASONE PROPIONATE SCH SPRAY: 50 SPRAY, METERED NASAL at 20:28

## 2018-06-25 NOTE — EKG
Date Performed: 06/25/2018       Time Performed: 08:30:36

 

PTAGE:      78 years

 

EKG:      ELECTRONIC VENTRICULAR PACEMAKER PVCS ABNORMAL RHYTHM ECG

 

PREVIOUS TRACING       : 06/02/2018 18.58 Since the previous tracing, no significant change noted

 

DOCTOR:   Fadi Armstrong  Interpretating Date/Time  06/25/2018 22:42:02

## 2018-06-25 NOTE — HHI.HP
__________________________________________________





[facility]


Service


[facility]


Primary Care Physician


Charles Owens MD


Admission Diagnosis





Acute on chronic congestive heart failure, elevated troponin, hypoxi


Diagnoses:  


(1) Elevated troponin I level


(2) Acute on chronic systolic congestive heart failure


(3) ICD (implantable cardioverter-defibrillator) in place


(4) Hypertension


(5) Coronary artery disease


Chief Complaint:  


"Flash pulmonary edema"


Travel History


International Travel<30 Days:  No


Contact w/Intl Traveler <30 Da:  No


Traveled to Known Affected Are:  No


History of Present Illness


The patient is a 78-year-old female with known history of congestive heart 

failure and multiple recent admissions who presented to the emergency 

department for complaint of "flash pulmonary edema".  She states that she did 

not sleep well last night and developed worsening shortness of breath this 

morning.  Her symptoms got much worse at about 7:00 this morning.  She denies 

chest pain.  She has an event monitor and pressed the button to notify her 

cardiologist that she was potentially feeling an arrhythmia.  She also has an 

implanted defibrillator, which has been interrogated by the [employer] rep this 

morning.  It appears to be functioning normally.  It did not fire.  She states 

that she is feeling a little bit better at this time.  She took her Lasix 40 mg 

this morning, but none of her other medications.





Review of Systems


Constitutional:  DENIES: Fever, Chills, Night Sweats


Eyes:  DENIES: Blurred vision, Vision loss


Ears, nose, mouth, throat:  DENIES: Hearing loss


Respiratory:  COMPLAINS OF: Shortness of breath, DENIES: Cough, Wheezing, 

Sputum production


Cardiovascular:  COMPLAINS OF: Dyspnea on Exertion, DENIES: Chest pain, 

Palpitations, Lower Extremity Edema


Gastrointestinal:  DENIES: Abdominal pain, Constipation, Diarrhea, Nausea, 

Vomiting


Genitourinary:  DENIES: Urinary frequency, Urinary incontinence, Urgency, 

Hematuria, Dysuria, Nocturia


Musculoskeletal:  DENIES: Joint pain, Muscle aches


Integumentary:  DENIES: Pruritus, Rash


Hematologic/lymphatic:  DENIES: Bruising


Neurologic:  DENIES: Headache





Past Family Social History


Past Medical History


Atrial fibrillation


CAD


Chronic systolic CHF


Hypertension


Hx of skin cancer


Past Surgical History


Cholecystectomy


AICD placement


Cardiac catheterization with stent placement


CABG in 


Tubal ligation


Tonsillectomy


Reported Medications


Captopril 50 Mg Tab 50 Mg PO BID


     Take 1 hour before meals.


Flonase Nasal Spray (Fluticasone Nasal Spray) 50 Mcg/Act Spray 100 Mcg EACH 

NARE BID


Claritin (Loratadine) 10 Mg Cap 10 Mg PO DAILY


Aspirin 81 Mg Chew 81 Mg CHEW DAILY


Doxycycline (Doxycycline (Monohydrate)) 100 Mg Cap   


Eliquis (Apixaban) 5 Mg Tab 5 Mg PO BID


Lasix (Furosemide) 40 Mg Tab 40 Mg PO DAILY


Coreg (Carvedilol) 12.5 Mg Tab 12.5 Mg PO BID


Allergies:  


Coded Allergies:  


     lovastatin (Unverified  Allergy, Severe, SKIN RASH AND ITCHING, 18)


     acetaminophen (Unverified  Adverse Reaction, Severe, "AGITATION ,DRY MOUTH

, RAPID HEARTBEAT", 18)


     adhesive (Unverified  Adverse Reaction, Severe, "SKIN BURNS", 18)


     amoxicillin (Unverified  Adverse Reaction, Severe, "DIARRHEA", 18)


     atenolol (Unverified  Adverse Reaction, Severe, "INSOMNIA, NERVOUSNESS", )


     atorvastatin (Unverified  Adverse Reaction, Severe, "EXTREME FATIGUE AND 

ANXIETY", 18)


     cetirizine (Unverified  Adverse Reaction, Severe, "FATIGUE AND DROWSINESS"

, 18)


     ciprofloxacin (Unverified  Adverse Reaction, Severe, "HOT FLUSHING", )


     codeine (Unverified  Adverse Reaction, Severe, "AGITATION, DRY MOUTH ,

RAPID HEARTBEAT", 18)


     diazepam (Unverified  Adverse Reaction, Severe, "TREMORS", 18)


     digoxin (Unverified  Adverse Reaction, Severe, "DIARRHEA AND NAUSEA", )


     diltiazem (Unverified  Adverse Reaction, Severe, "DIARRHEA", 18)


     epinephrine (Unverified  Adverse Reaction, Severe, "HEART RACES, FAINTING"

, 18)


     fexofenadine (Unverified  Adverse Reaction, Severe, "EXTREME FATIGUE AND 

DROWSINESS", 18)


     lidocaine (Unverified  Adverse Reaction, Severe, "EXTREME FLUSHING, HOT 

FEELING", 18)


     losartan (Unverified  Adverse Reaction, Severe, "MADE HEART FEEL IT WAS 

LABORING TO WORK", 18)


     meperidine (Unverified  Adverse Reaction, Severe, "DIZZINESS, RAPID 

HEARTBEAT", 18)


     procaine (Unverified  Adverse Reaction, Severe, "RAPID HEARTBEAT", 18)


     propoxyphene (Unverified  Adverse Reaction, Severe, "AGITATION ,DRY MOUTH, 

RAPID HEARTBEAT", 18)


Uncoded Allergies:  


     HISMANEL (Adverse Reaction, Severe, "EXTREME FATIGUE AND PVC'S, 11)


     SELDANE (Adverse Reaction, Severe, "PVC'S", 11)


Family History


CVA, heart disease


Social History


Drinks a glass of wine daily.  Smokes 1 pack per day for 30 years but quit in 

.  Denies illicit drug use.





Physical Exam


Vital Signs





Vital Signs








  Date Time  Temp Pulse Resp B/P (MAP) Pulse Ox O2 Delivery O2 Flow Rate FiO2


 


18 11:03  70 18 135/71 (92) 95 Nasal Cannula 2.00 


 


18 08:24     91 Nasal Cannula 2.00 


 


18 08:22 98.6 110 18 189/110 (136) 92 Nasal Cannula 2.00 


 


18 08:21     91 Nasal Cannula 2.00 


 


18 08:12     90   








Physical Exam


GENERAL: Well-nourished, well-developed female in no acute distress. 


HEENT: Normocephalic, atraumatic. Pupils equal, round and reactive. Extraocular 

movements intact. No scleral icterus. No injection or drainage. Oropharynx is 

clear. Mucous membranes are moist. 


CARDIOVASCULAR: Regular rate and rhythm without murmurs, gallops, or rubs. 


RESPIRATORY: Decreased breath sounds at both bases, left greater than right. No 

wheezes, rales, or rhonchi. Breathing is non-labored. 


GASTROINTESTINAL: Abdomen soft, non-tender, nondistended.  


EXTREMITIES: No lower extremity edema. No calf tenderness.


PSYCH: Alert and oriented x 3.


Laboratory





Laboratory Tests








Test


  18


08:25


 


White Blood Count 11.1 


 


Red Blood Count 4.96 


 


Hemoglobin 15.6 


 


Hematocrit 46.5 


 


Mean Corpuscular Volume 93.6 


 


Mean Corpuscular Hemoglobin 31.4 


 


Mean Corpuscular Hemoglobin


Concent 33.6 


 


 


Red Cell Distribution Width 14.1 


 


Platelet Count 183 


 


Mean Platelet Volume 8.4 


 


Neutrophils (%) (Auto) 80.1 


 


Lymphocytes (%) (Auto) 13.4 


 


Monocytes (%) (Auto) 5.3 


 


Eosinophils (%) (Auto) 1.0 


 


Basophils (%) (Auto) 0.2 


 


Neutrophils # (Auto) 8.9 


 


Lymphocytes # (Auto) 1.5 


 


Monocytes # (Auto) 0.6 


 


Eosinophils # (Auto) 0.1 


 


Basophils # (Auto) 0.0 


 


CBC Comment DIFF FINAL 


 


Differential Comment  


 


Prothrombin Time 10.7 


 


Prothromb Time International


Ratio 1.1 


 


 


Activated Partial


Thromboplast Time 24.6 


 


 


Blood Urea Nitrogen 16 


 


Creatinine 0.97 


 


Random Glucose 130 


 


Total Protein 7.2 


 


Albumin 3.5 


 


Calcium Level 8.7 


 


Magnesium Level 2.0 


 


Alkaline Phosphatase 95 


 


Aspartate Amino Transf


(AST/SGOT) 39 


 


 


Alanine Aminotransferase


(ALT/SGPT) 32 


 


 


Total Bilirubin 0.5 


 


Sodium Level 141 


 


Potassium Level 3.7 


 


Chloride Level 106 


 


Carbon Dioxide Level 23.7 


 


Anion Gap 11 


 


Estimat Glomerular Filtration


Rate 56 


 


 


Total Creatine Kinase 127 


 


Creatine Kinase MB 2.7 


 


Troponin I 0.29 


 


B-Type Natriuretic Peptide 547 








Result Diagram:  


18





Imaging





Last Impressions








Chest X-Ray 18 0816 Signed





Impressions: 





 CONCLUSION: 





 1.  Cardiomegaly with mild positive fluid balance superimposed on chronic inter





 stitial changes.





 2.  Minimal bibasilar airspace disease, likely atelectasis.





  





 











Caprini VTE Risk Assessment


Caprini VTE Risk Assessment:  Mod/High Risk (score >= 2)


Caprini Risk Assessment Model











 Point Value = 1          Point Value = 2  Point Value = 3  Point Value = 5


 


Age 41-60


Minor surgery


BMI > 25 kg/m2


Swollen legs


Varicose veins


Pregnancy or postpartum


History of unexplained or recurrent


   spontaneous 


Oral contraceptives or hormone


   replacement


Sepsis (< 1 month)


Serious lung disease, including


   pneumonia (< 1 month)


Abnormal pulmonary function


Acute myocardial infarction


Congestive heart failure (< 1 month)


History of inflammatory bowel disease


Medical patient at bed rest Age 61-74


Arthroscopic surgery


Major open surgery (> 45 min)


Laparoscopic surgery (> 45 min)


Malignancy


Confined to bed (> 72 hours)


Immobilizing plaster cast


Central venous access Age >= 75


History of VTE


Family history of VTE


Factor V Leiden


Prothrombin 27808O


Lupus anticoagulant


Anticardiolipin antibodies


Elevated serum homocysteine


Heparin-induced thrombocytopenia


Other congenital or acquired


   thrombophilia Stroke (< 1 month)


Elective arthroplasty


Hip, pelvis, or leg fracture


Acute spinal cord injury (< 1 month)








Prophylaxis Regimen











   Total Risk


Factor Score Risk Level Prophylaxis Regimen


 


0-1      Low Early ambulation


 


2 Moderate Order ONE of the following:


*Sequential Compression Device (SCD)


*Heparin 5000 units SQ BID


 


3-4 Higher Order ONE of the following medications:


*Heparin 5000 units SQ TID


*Enoxaparin/Lovenox 40 mg SQ daily (WT < 150 kg, CrCl > 30 mL/min)


*Enoxaparin/Lovenox 30 mg SQ daily (WT < 150 kg, CrCl > 10-29 mL/min)


*Enoxaparin/Lovenox 30 mg SQ BID (WT < 150 kg, CrCl > 30 mL/min)


AND/OR


*Sequential Compression Device (SCD)


 


5 or more Highest Order ONE of the following medications:


*Heparin 5000 units SQ TID (Preferred with Epidurals)


*Enoxaparin/Lovenox 40 mg SQ daily (WT < 150 kg, CrCl > 30 mL/min)


*Enoxaparin/Lovenox 30 mg SQ daily (WT < 150 kg, CrCl > 10-29 mL/min)


*Enoxaparin/Lovenox 30 mg SQ BID (WT < 150 kg, CrCl > 30 mL/min)


AND


*Sequential Compression Device (SCD)











Assessment and Plan


Assessment and Plan


1.  Acute exacerbation of chronic systolic congestive heart failure: Continue 

diuresis with Lasix IV.  Check 2D echocardiogram.  Consult patient's 

cardiologist.  Monitor on telemetry.  Continue Coreg.


2.  Elevated troponin: Likely at baseline.  Check serial cardiac enzymes.


3.  Hypertension: Continue Lasix, captopril, Coreg.


4.  Atrial fibrillation: Continue Coreg.  Continue Eliquis.


5.  DVT prophylaxis: Eliquis.











Robson Schaeffer MD 2018 11:14

## 2018-06-25 NOTE — PD
HPI


Chief Complaint:  Respiratory Distress


Time Seen by Provider:  08:10


Travel History


International Travel<30 days:  No


Contact w/Intl Traveler<30days:  No


Traveled to known affect area:  No





History of Present Illness


HPI


The patient is a 78-year-old  female who presents to the emergency 

department via EMS for shortness of breath.  The patient states she had 

difficulty sleeping all night, was unable to get comfortable, and this morning 

developed acute shortness of breath.  The patient does have a history of acute 

flash pulmonary edema with recent hospitalization earlier this month.  The 

patient is followed by her cardiologist, Dr. Akbar, and states her last 

echocardiogram was approximately 1-2 years ago.  The patient denies any known 

history of COPD.  She does complain of mild wheezing, is unsure if it is 

related to significant postnasal drainage or related to the lungs.  The patient 

did take her Lasix this morning it was administered 1 DuoNeb and 2 albuterol 

nebulizers by EMS prior to arrival.  The patient denies any known history of 

COPD and is not oxygen dependent at home.  The patient denies any new cough or 

chest pain.  She did complain of mild abdominal discomfort prior to the onset 

of her symptoms, but denies any acute chest pain.  She denies any nausea, 

vomiting, or diaphoresis.  The patient does have a history of intermittent 

atrial fibrillation is currently anticoagulated with Eliquis.  The patient also 

has an event monitor and states that she had a button to send a message to her 

cardiologist office this morning to investigate whether there was an underlying 

arrhythmia.





PFSH


Past Medical History


Arthritis:  Yes


Asthma:  No


Autoimmune Disease:  No


Anxiety:  Yes


Depression:  No


Heart Rhythm Problems:  Yes (hx afib)


Cancer:  Yes (SKIN)


Cardiovascular Problems:  Yes


High Cholesterol:  No


Chemotherapy:  No


Chest Pain:  Yes


Congestive Heart Failure:  Yes


COPD:  No


Diabetes:  No


Endocrine:  No


Gastrointestinal Disorders:  No


GERD:  No


Genitourinary:  No


Hiatal Hernia:  No


Heparin Induced Thrombocytopen:  No


Hypertension:  Yes


Immune Disorder:  No


Implanted Vascular Access Dvce:  No


Kidney Stones:  No


Musculoskeletal:  Yes


Neurologic:  No


Psychiatric:  No


Reproductive:  No


Respiratory:  No


Myocardial Infarction:  Yes (x2)


Radiation Therapy:  No


Renal Failure:  No


Sickle Cell Disease:  No


Sleep Apnea:  No


Thyroid Disease:  No


Ulcer:  No


Menopausal:  Yes


Tubal Ligation:  Yes





Past Surgical History


Abdominal Surgery:  Yes (gallbladder 1999)


AICD:  Yes


Arteriovenous Shunt:  No


Cardiac Surgery:  Yes (stent x1 2013)


Cholecystectomy:  Yes (1999)


Coronary Artery Bypass Graft:  Yes (1992 1 vessel bypassed)


Ear Surgery:  No


Endocrine Surgery:  No


Eye Surgery:  No


Genitourinary Surgery:  No


Gynecologic Surgery:  Yes (tubal ligation)


Insulin Pump:  No


Joint Replacement:  No


Neurologic Surgery:  No


Oral Surgery:  No


Pacemaker:  No


Thoracic Surgery:  No


Tonsillectomy:  Yes


Other Surgery:  Yes





Social History


Alcohol Use:  Yes ( 1- 2 DRINKS A DAY)


Tobacco Use:  No


Substance Use:  No





Allergies-Medications


(Allergen,Severity, Reaction):  


Coded Allergies:  


     lovastatin (Unverified  Allergy, Severe, SKIN RASH AND ITCHING, 6/25/18)


     acetaminophen (Unverified  Adverse Reaction, Severe, "AGITATION ,DRY MOUTH

, RAPID HEARTBEAT", 6/25/18)


     adhesive (Unverified  Adverse Reaction, Severe, "SKIN BURNS", 6/25/18)


     amoxicillin (Unverified  Adverse Reaction, Severe, "DIARRHEA", 6/25/18)


     atenolol (Unverified  Adverse Reaction, Severe, "INSOMNIA, NERVOUSNESS", 6/ 25/18)


     atorvastatin (Unverified  Adverse Reaction, Severe, "EXTREME FATIGUE AND 

ANXIETY", 6/25/18)


     cetirizine (Unverified  Adverse Reaction, Severe, "FATIGUE AND DROWSINESS"

, 6/25/18)


     ciprofloxacin (Unverified  Adverse Reaction, Severe, "HOT FLUSHING", 6/25/ 18)


     codeine (Unverified  Adverse Reaction, Severe, "AGITATION, DRY MOUTH ,

RAPID HEARTBEAT", 6/25/18)


     diazepam (Unverified  Adverse Reaction, Severe, "TREMORS", 6/25/18)


     digoxin (Unverified  Adverse Reaction, Severe, "DIARRHEA AND NAUSEA", 6/25/ 18)


     diltiazem (Unverified  Adverse Reaction, Severe, "DIARRHEA", 6/25/18)


     epinephrine (Unverified  Adverse Reaction, Severe, "HEART RACES, FAINTING"

, 6/25/18)


     fexofenadine (Unverified  Adverse Reaction, Severe, "EXTREME FATIGUE AND 

DROWSINESS", 6/25/18)


     lidocaine (Unverified  Adverse Reaction, Severe, "EXTREME FLUSHING, HOT 

FEELING", 6/25/18)


     losartan (Unverified  Adverse Reaction, Severe, "MADE HEART FEEL IT WAS 

LABORING TO WORK", 6/25/18)


     meperidine (Unverified  Adverse Reaction, Severe, "DIZZINESS, RAPID 

HEARTBEAT", 6/25/18)


     procaine (Unverified  Adverse Reaction, Severe, "RAPID HEARTBEAT", 6/25/18)


     propoxyphene (Unverified  Adverse Reaction, Severe, "AGITATION ,DRY MOUTH, 

RAPID HEARTBEAT", 6/25/18)


Uncoded Allergies:  


     HISMANEL (Adverse Reaction, Severe, "EXTREME FATIGUE AND PVC'S, 4/2/11)


     SELDANE (Adverse Reaction, Severe, "PVC'S", 4/2/11)


Reported Meds & Prescriptions





Reported Meds & Active Scripts


Active


Reported


Captopril 50 Mg Tab 50 Mg PO BID


     Take 1 hour before meals.


Flonase Nasal Spray (Fluticasone Nasal Spray) 50 Mcg/Act Spray 100 Mcg EACH 

NARE BID


Claritin (Loratadine) 10 Mg Cap 10 Mg PO DAILY


Aspirin 81 Mg Chew 81 Mg CHEW DAILY


Doxycycline (Doxycycline (Monohydrate)) 100 Mg Cap   


Eliquis (Apixaban) 5 Mg Tab 5 Mg PO BID


Lasix (Furosemide) 40 Mg Tab 40 Mg PO DAILY


Coreg (Carvedilol) 12.5 Mg Tab 12.5 Mg PO BID








Review of Systems


Except as stated in HPI:  all other systems reviewed are Neg


General / Constitutional:  No: Fever


HENT:  No: Lightheadedness


Cardiovascular:  No: Chest Pain or Discomfort


Respiratory:  Positive: Shortness of Breath, Wheezing, No: Cough


Gastrointestinal:  Positive: Abdominal Pain (At the onset of symptoms patient 

had abdominal pain which is currently resolved), No: Nausea, Vomiting


Musculoskeletal:  No: Edema





Physical Exam


Narrative


GENERAL: Awake, alert, pleasant 78-year-old female who appears her stated age 

and is in mild respiratory distress.


SKIN: Focused skin assessment warm/dry.


HEAD: Atraumatic. Normocephalic. 


EYES: Pupils equal and round. No scleral icterus. No injection or drainage. 


ENT: No nasal bleeding or discharge.  Mucous membranes pink and moist.


NECK: Trachea midline. No JVD. 


CARDIOVASCULAR: Regular, tachycardic with a heart rate of 110.


RESPIRATORY: Tachypnea with a respiratory rate of 22.  Crackles in the bases 

bilateral.


GASTROINTESTINAL: Abdomen soft, non-tender, nondistended. 


MUSCULOSKELETAL: No obvious deformities. No clubbing.  No cyanosis.  No edema.  

Positive dorsalis pedal pulses bilaterally.


NEUROLOGICAL: Awake and alert. No obvious cranial nerve deficits.  Motor 

grossly within normal limits. Normal speech.


PSYCHIATRIC: Appropriate mood and affect; insight and judgment normal.





Data


Data


Last Documented VS





Vital Signs








  Date Time  Temp Pulse Resp B/P (MAP) Pulse Ox O2 Delivery O2 Flow Rate FiO2


 


6/25/18 08:24     91 Nasal Cannula 2.00 


 


6/25/18 08:22 98.6 110 18 189/110 (136)    








Orders





 Orders


Complete Blood Count With Diff (6/25/18 08:16)


Comprehensive Metabolic Panel (6/25/18 08:16)


B-Type Natriuretic Peptide (6/25/18 08:16)


Act Partial Throm Time (Ptt) (6/25/18 08:16)


Prothrombin Time / Inr (Pt) (6/25/18 08:16)


Magnesium (Mg) (6/25/18 08:16)


Ckmb (Isoenzyme) Profile (6/25/18 08:16)


Troponin I (6/25/18 08:16)


Iv Access Insert/Monitor (6/25/18 08:16)


Electrocardiogram (6/25/18 08:16)


Ecg Monitoring (6/25/18 08:16)


Oximetry (6/25/18 08:16)


Oxygen Administration (6/25/18 08:16)


Chest, Pa & Lat (6/25/18 08:16)


Sodium Chloride 0.9% Flush (Ns Flush) (6/25/18 08:30)


Aspirin Chew (Aspirin Chew) (6/25/18 08:30)


CKMB (6/25/18 08:25)


CKMB% (6/25/18 08:25)


Admit Order (Ed Use Only) (6/25/18 10:30)





Labs





Laboratory Tests








Test


  6/25/18


08:25


 


White Blood Count 11.1 TH/MM3 


 


Red Blood Count 4.96 MIL/MM3 


 


Hemoglobin 15.6 GM/DL 


 


Hematocrit 46.5 % 


 


Mean Corpuscular Volume 93.6 FL 


 


Mean Corpuscular Hemoglobin 31.4 PG 


 


Mean Corpuscular Hemoglobin


Concent 33.6 % 


 


 


Red Cell Distribution Width 14.1 % 


 


Platelet Count 183 TH/MM3 


 


Mean Platelet Volume 8.4 FL 


 


Neutrophils (%) (Auto) 80.1 % 


 


Lymphocytes (%) (Auto) 13.4 % 


 


Monocytes (%) (Auto) 5.3 % 


 


Eosinophils (%) (Auto) 1.0 % 


 


Basophils (%) (Auto) 0.2 % 


 


Neutrophils # (Auto) 8.9 TH/MM3 


 


Lymphocytes # (Auto) 1.5 TH/MM3 


 


Monocytes # (Auto) 0.6 TH/MM3 


 


Eosinophils # (Auto) 0.1 TH/MM3 


 


Basophils # (Auto) 0.0 TH/MM3 


 


CBC Comment DIFF FINAL 


 


Differential Comment  


 


Prothrombin Time 10.7 SEC 


 


Prothromb Time International


Ratio 1.1 RATIO 


 


 


Activated Partial


Thromboplast Time 24.6 SEC 


 


 


Blood Urea Nitrogen 16 MG/DL 


 


Creatinine 0.97 MG/DL 


 


Random Glucose 130 MG/DL 


 


Total Protein 7.2 GM/DL 


 


Albumin 3.5 GM/DL 


 


Calcium Level 8.7 MG/DL 


 


Magnesium Level 2.0 MG/DL 


 


Alkaline Phosphatase 95 U/L 


 


Aspartate Amino Transf


(AST/SGOT) 39 U/L 


 


 


Alanine Aminotransferase


(ALT/SGPT) 32 U/L 


 


 


Total Bilirubin 0.5 MG/DL 


 


Sodium Level 141 MEQ/L 


 


Potassium Level 3.7 MEQ/L 


 


Chloride Level 106 MEQ/L 


 


Carbon Dioxide Level 23.7 MEQ/L 


 


Anion Gap 11 MEQ/L 


 


Estimat Glomerular Filtration


Rate 56 ML/MIN 


 


 


Total Creatine Kinase 127 U/L 


 


Creatine Kinase MB 2.7 NG/ML 


 


Troponin I 0.29 NG/ML 


 


B-Type Natriuretic Peptide 547 PG/ML 











MDM


Medical Decision Making


Medical Screen Exam Complete:  Yes


Emergency Medical Condition:  Yes


Medical Record Reviewed:  Yes


Interpretation(s)


EKG reveals electronic ventricular pacemaker with PVCs.








Last Impressions








Chest X-Ray 6/25/18 0816 Signed





Impressions: 





 CONCLUSION: 





 1.  Cardiomegaly with mild positive fluid balance superimposed on chronic inter





 stitial changes.





 2.  Minimal bibasilar airspace disease, likely atelectasis.





  





 








Laboratory Tests








Test


  6/25/18


08:25


 


White Blood Count 11.1 TH/MM3 


 


Red Blood Count 4.96 MIL/MM3 


 


Hemoglobin 15.6 GM/DL 


 


Hematocrit 46.5 % 


 


Mean Corpuscular Volume 93.6 FL 


 


Mean Corpuscular Hemoglobin 31.4 PG 


 


Mean Corpuscular Hemoglobin


Concent 33.6 % 


 


 


Red Cell Distribution Width 14.1 % 


 


Platelet Count 183 TH/MM3 


 


Mean Platelet Volume 8.4 FL 


 


Neutrophils (%) (Auto) 80.1 % 


 


Lymphocytes (%) (Auto) 13.4 % 


 


Monocytes (%) (Auto) 5.3 % 


 


Eosinophils (%) (Auto) 1.0 % 


 


Basophils (%) (Auto) 0.2 % 


 


Neutrophils # (Auto) 8.9 TH/MM3 


 


Lymphocytes # (Auto) 1.5 TH/MM3 


 


Monocytes # (Auto) 0.6 TH/MM3 


 


Eosinophils # (Auto) 0.1 TH/MM3 


 


Basophils # (Auto) 0.0 TH/MM3 


 


CBC Comment DIFF FINAL 


 


Differential Comment  


 


Prothrombin Time 10.7 SEC 


 


Prothromb Time International


Ratio 1.1 RATIO 


 


 


Activated Partial


Thromboplast Time 24.6 SEC 


 


 


Blood Urea Nitrogen 16 MG/DL 


 


Creatinine 0.97 MG/DL 


 


Random Glucose 130 MG/DL 


 


Total Protein 7.2 GM/DL 


 


Albumin 3.5 GM/DL 


 


Calcium Level 8.7 MG/DL 


 


Magnesium Level 2.0 MG/DL 


 


Alkaline Phosphatase 95 U/L 


 


Aspartate Amino Transf


(AST/SGOT) 39 U/L 


 


 


Alanine Aminotransferase


(ALT/SGPT) 32 U/L 


 


 


Total Bilirubin 0.5 MG/DL 


 


Sodium Level 141 MEQ/L 


 


Potassium Level 3.7 MEQ/L 


 


Chloride Level 106 MEQ/L 


 


Carbon Dioxide Level 23.7 MEQ/L 


 


Anion Gap 11 MEQ/L 


 


Estimat Glomerular Filtration


Rate 56 ML/MIN 


 


 


Total Creatine Kinase 127 U/L 


 


Creatine Kinase MB 2.7 NG/ML 


 


Troponin I 0.29 NG/ML 


 


B-Type Natriuretic Peptide 547 PG/ML 








Differential Diagnosis


Differential diagnosis includes flash pulmonary edema, congestive heart failure

, pleural effusion, ACS, STEMI, valvular disorder, pulmonary embolism, COPD, 

bronchitis, pneumonia.


Narrative Course


IV was established, labs are drawn and sent, and the patient was placed on 

cardiac telemetry monitoring and continuous pulse oximetry monitoring.  EKG was 

ordered and interpreted.  Chest x-ray was obtained.  Chest x-ray reveals mild 

volume overload on chronic interstitial changes.  BNP was greater than 500, 

troponin was positive at 0.29, this appears to be her baseline when compared to 

previous admissions.  [employer] was paged to interrogate the patient's pacemaker

, patient was noted to be hypoxic at 90% on room air.  The patient was placed 

on 2 L which brought her oxygen levels up to 94%, she was reevaluated at 10:10 

AM, still has mild shortness of breath and lethargy.  The patient will be 

admitted as she does appear to have acute on chronic CHF with mild hypoxia.  

Patient may benefit from evaluation by her cardiologist, Dr. Akbar, as well 

as possible echocardiogram.  The patient agrees and understands.





Physician Communication


Physician Communication


[facility] were paged for admission.  I discussed the patient 

with Dr. Schaeffer who agrees with admission.





Diagnosis





 Primary Impression:  


 Acute on chronic systolic congestive heart failure


 Additional Impression:  


 Elevated troponin I level


Condition:  Stable











Earl Landeros MD Jun 25, 2018 08:26

## 2018-06-25 NOTE — EKG
Date Performed: 06/25/2018       Time Performed: 20:38:54

 

PTAGE:      78 years

 

EKG:      Ventricular pacing Pacemaker rhythm - no further analysis Abnormal ECG probably  No signifi
cant change from prior electrocardiogram. . 

 

 PREVIOUS TRACING            : 06/25/2018 08.30

 

DOCTOR:   Jcarlos Cummings  Interpretating Date/Time  06/25/2018 22:30:02

## 2018-06-25 NOTE — RADRPT
EXAM DATE:  6/25/2018 8:51 AM EDT

AGE/SEX:        78 years / Female



INDICATIONS:  Shortness of breath.



CLINICAL DATA:  This is the patient's initial encounter. Patient reports that signs and symptoms have
 been present for 2 days and indicates a pain score of 0/10. 

                                                                          

MEDICAL/SURGICAL HISTORY:       Congestive heart failure. Pacemaker.  CABG.



COMPARISON:      [facility] – [place] SINGLE AP, 6/2/2018.  . 





FINDINGS:  

Stable multilead AICD device with postsurgical features of prior median sternotomy and cardiac surger
y. Redemonstration of lower lobe predominant diffuse interstitial prominence. Minimal by basilar airs
pace disease. Cardiac silhouette is enlarged with slight indistinct central pulmonary vascularity. Ki
ny thorax is intact.



CONCLUSION: 

1.  Cardiomegaly with mild positive fluid balance superimposed on chronic interstitial changes.

2.  Minimal bibasilar airspace disease, likely atelectasis.



Electronically signed by: Gilmar Chung MD  6/25/2018 9:15 AM EDT

## 2018-06-25 NOTE — MB
cc:

Tanisha Beltrán MD

****

 

 

DATE:

06/25/2018

 

HISTORY OF PRESENT ILLNESS:

Ms. Barfield is a very pleasant 78-year-old white female with history of

ischemic cardiomyopathy, ejection fraction less than 30%, chronic 

systolic congestive heart failure, coronary bypass and left circumflex

stenting, Medtronic Bi-V ICD and AV libby ablation, who presented with

severe shortness of breath.  Early this morning, she started to 

develop shortness of breath, took Lasix and within an hour, her 

symptoms progressively worsened and she presented to the emergency 

room.  Her device was interrogated this morning and her OptiVol has 

reportedly been unremarkable.  The patient's symptoms have improved 

with IV Lasix.  She has not had any angina.

 

PAST MEDICAL HISTORY:

Positive for recent admission for flash pulmonary edema on 06/02/2018,

history of chronic systolic congestive heart failure, ejection 

fraction less than 30%, ischemic cardiomyopathy, left circumflex 

artery stenting, coronary bypass, Medtronic biventricular ICD, history

of AV libby ablation, dyslipidemia, hypertension, history of skin 

cancer, cholecystectomy, tubal ligation, tonsillectomy.

 

MEDICATIONS:

Include:

1.  Captopril.

2.  Flonase.

3.  Claritin.

4.  Aspirin.

5.  Doxycycline.

6.  Eliquis.

7.  Lasix.

8.  Coreg.

 

ALLERGIES:

PROPOXYPHENE, PROCAINE, MEPERIDINE, LOSARTAN, LIDOCAINE, FEXOFENADINE,

EPINEPHRINE, DILTIAZEM, DIGOXIN, DIAZEPAM, CODEINE,  CIPROFLOXACIN, 

CETIRIZINE,  ATORVASTATIN, ATENOLOL, AMOXICILLIN, ADHESIVE, 

ACETAMINOPHEN, LOVASTATIN.

 

SOCIAL HISTORY:

The patient quit smoking in 1990.  She drinks a glass of wine daily.

 

FAMILY HISTORY:

Positive for heart disease.

 

REVIEW OF SYSTEMS:

Otherwise negative.

 

PHYSICAL EXAMINATION:

VITAL SIGNS:  Blood pressure 135/71, pulse 86, regular.

HEENT:  Negative.

NECK:  2+ carotid upstrokes.  No bruits.

LUNGS:  Clear with decreased breath sounds at bases.

HEART:  Regular with no murmur or gallop.

ABDOMEN:  Soft. No bruits.

EXTREMITIES:  Trace edema, 1+ distal pulses.

NEUROLOGIC:  Grossly nonfocal.

 

LABORATORY DATA:

EKG was reviewed and showed biventricular pacing and PVCs.

 

Hemoglobin 15.6, potassium 3.7, creatinine 1.0, magnesium 2.0, AST 39,

ALT 32.  Troponin 0.29.  .

 

ASSESSMENT AND PLAN:

1.  Acute exacerbation of chronic systolic congestive heart failure.

2.  Ischemic cardiomyopathy with severe left ventricular systolic 

dysfunction.

3.  Mildly elevated troponin.

4.  Coronary artery disease with a history of coronary bypass and 

coronary intervention.

5.  Hypertension.

6.  Atrial fibrillation.

7.  Status post Medtronic Bi-V ICD placement.

 

Ms. Barfield will be monitored on telemetry.  We will continue IV 

diuresis.  We will continue therapy for congestive heart failure.  Her

troponin is slightly elevated, but is unchanged from her previous 

admission.  I will follow her for cardiology during her hospitalization.

She will follow up with Dr. Akbar, her primary cardiologist,

in his office after discharge.

 

 

__________________________________

Tanisha Beltrán MD

 

 

OQ/TL

D: 06/25/2018, 02:44 PM

T: 06/25/2018, 03:19 PM

Visit #: [id number]

Job #: [id number]

[email or web address]

## 2018-06-26 VITALS
OXYGEN SATURATION: 95 % | HEART RATE: 69 BPM | DIASTOLIC BLOOD PRESSURE: 69 MMHG | TEMPERATURE: 98.1 F | SYSTOLIC BLOOD PRESSURE: 140 MMHG

## 2018-06-26 VITALS — OXYGEN SATURATION: 95 %

## 2018-06-26 VITALS
DIASTOLIC BLOOD PRESSURE: 62 MMHG | TEMPERATURE: 98.2 F | OXYGEN SATURATION: 98 % | HEART RATE: 63 BPM | SYSTOLIC BLOOD PRESSURE: 127 MMHG

## 2018-06-26 VITALS — HEART RATE: 61 BPM

## 2018-06-26 VITALS — HEART RATE: 62 BPM

## 2018-06-26 VITALS — HEART RATE: 67 BPM

## 2018-06-26 VITALS — HEART RATE: 84 BPM

## 2018-06-26 VITALS — HEART RATE: 75 BPM

## 2018-06-26 VITALS
HEART RATE: 71 BPM | DIASTOLIC BLOOD PRESSURE: 75 MMHG | OXYGEN SATURATION: 93 % | TEMPERATURE: 97.9 F | RESPIRATION RATE: 18 BRPM | SYSTOLIC BLOOD PRESSURE: 132 MMHG

## 2018-06-26 VITALS — HEART RATE: 71 BPM

## 2018-06-26 VITALS — HEART RATE: 66 BPM

## 2018-06-26 VITALS — HEART RATE: 76 BPM

## 2018-06-26 VITALS — HEART RATE: 72 BPM

## 2018-06-26 VITALS
OXYGEN SATURATION: 95 % | HEART RATE: 71 BPM | RESPIRATION RATE: 18 BRPM | SYSTOLIC BLOOD PRESSURE: 110 MMHG | TEMPERATURE: 98 F | DIASTOLIC BLOOD PRESSURE: 61 MMHG

## 2018-06-26 VITALS
OXYGEN SATURATION: 93 % | RESPIRATION RATE: 18 BRPM | TEMPERATURE: 98.1 F | HEART RATE: 72 BPM | DIASTOLIC BLOOD PRESSURE: 78 MMHG | SYSTOLIC BLOOD PRESSURE: 138 MMHG

## 2018-06-26 VITALS — HEART RATE: 58 BPM

## 2018-06-26 LAB
BASOPHILS # BLD AUTO: 0 TH/MM3 (ref 0–0.2)
BASOPHILS NFR BLD: 0.2 % (ref 0–2)
BUN SERPL-MCNC: 14 MG/DL (ref 7–18)
CALCIUM SERPL-MCNC: 8.5 MG/DL (ref 8.5–10.1)
CHLORIDE SERPL-SCNC: 105 MEQ/L (ref 98–107)
CREAT SERPL-MCNC: 0.72 MG/DL (ref 0.5–1)
EOSINOPHIL # BLD: 0.2 TH/MM3 (ref 0–0.4)
EOSINOPHIL NFR BLD: 2.4 % (ref 0–4)
ERYTHROCYTE [DISTWIDTH] IN BLOOD BY AUTOMATED COUNT: 14.2 % (ref 11.6–17.2)
GFR SERPLBLD BASED ON 1.73 SQ M-ARVRAT: 79 ML/MIN (ref 89–?)
GLUCOSE SERPL-MCNC: 94 MG/DL (ref 74–106)
HCO3 BLD-SCNC: 29.3 MEQ/L (ref 21–32)
HCT VFR BLD CALC: 40.2 % (ref 35–46)
HGB BLD-MCNC: 13.6 GM/DL (ref 11.6–15.3)
LYMPHOCYTES # BLD AUTO: 1.9 TH/MM3 (ref 1–4.8)
LYMPHOCYTES NFR BLD AUTO: 30.2 % (ref 9–44)
MCH RBC QN AUTO: 31.3 PG (ref 27–34)
MCHC RBC AUTO-ENTMCNC: 33.8 % (ref 32–36)
MCV RBC AUTO: 92.7 FL (ref 80–100)
MONOCYTE #: 0.6 TH/MM3 (ref 0–0.9)
MONOCYTES NFR BLD: 9.2 % (ref 0–8)
NEUTROPHILS # BLD AUTO: 3.7 TH/MM3 (ref 1.8–7.7)
NEUTROPHILS NFR BLD AUTO: 58 % (ref 16–70)
PLATELET # BLD: 151 TH/MM3 (ref 150–450)
PMV BLD AUTO: 8.4 FL (ref 7–11)
RBC # BLD AUTO: 4.34 MIL/MM3 (ref 4–5.3)
SODIUM SERPL-SCNC: 142 MEQ/L (ref 136–145)
WBC # BLD AUTO: 6.4 TH/MM3 (ref 4–11)

## 2018-06-26 PROCEDURE — B24BZZZ ULTRASONOGRAPHY OF HEART WITH AORTA: ICD-10-PCS | Performed by: INTERNAL MEDICINE

## 2018-06-26 RX ADMIN — FUROSEMIDE SCH MG: 10 INJECTION, SOLUTION INTRAMUSCULAR; INTRAVENOUS at 08:33

## 2018-06-26 RX ADMIN — APIXABAN SCH MG: 5 TABLET, FILM COATED ORAL at 08:34

## 2018-06-26 RX ADMIN — STANDARDIZED SENNA CONCENTRATE AND DOCUSATE SODIUM SCH TAB: 8.6; 5 TABLET, FILM COATED ORAL at 08:37

## 2018-06-26 RX ADMIN — Medication SCH ML: at 08:35

## 2018-06-26 RX ADMIN — POTASSIUM CHLORIDE SCH MEQ: 20 TABLET, EXTENDED RELEASE ORAL at 08:33

## 2018-06-26 RX ADMIN — CARVEDILOL SCH MG: 12.5 TABLET, FILM COATED ORAL at 08:33

## 2018-06-26 RX ADMIN — CAPTOPRIL SCH MG: 50 TABLET ORAL at 08:34

## 2018-06-26 RX ADMIN — FLUTICASONE PROPIONATE SCH SPRAY: 50 SPRAY, METERED NASAL at 08:35

## 2018-06-26 RX ADMIN — FUROSEMIDE SCH MG: 10 INJECTION, SOLUTION INTRAMUSCULAR; INTRAVENOUS at 17:06

## 2018-06-26 NOTE — PD.CARD.PN
Subjective


Subjective Remarks


No CP or SOB, feels back to baseline





Objective


Medications





Current Medications








 Medications


  (Trade)  Dose


 Ordered  Sig/Leanne


 Route  Start Time


 Stop Time Status Last Admin


 


  (Eliquis)  5 mg  BID


 PO  6/25/18 21:00


    6/26/18 08:34


 


 


  (Aspirin Chew)  81 mg  DAILY


 CHEW  6/26/18 09:00


    6/26/18 08:34


 


 


  (Capoten)  50 mg  BID


 PO  6/25/18 21:00


    6/26/18 08:34


 


 


  (Coreg)  12.5 mg  BID


 PO  6/25/18 21:00


    6/26/18 08:33


 


 


  (Claritin)  10 mg  DAILY


 PO  6/26/18 09:00


     


 


 


  (Flonase Merritt Spr)  1 spray  BID


 NASAL  6/25/18 21:00


    6/26/18 08:35


 


 


  (NS Flush)  2 ml  UNSCH  PRN


 IV FLUSH  6/25/18 10:45


     


 


 


  (NS Flush)  2 ml  BID


 IV FLUSH  6/25/18 21:00


    6/26/18 08:35


 


 


  (Tylenol)  650 mg  Q4H  PRN


 PO  6/25/18 10:45


     


 


 


  (Zofran Inj)  4 mg  Q6H  PRN


 IVP  6/25/18 10:45


     


 


 


  (Narcan Inj)  0.4 mg  UNSCH  PRN


 IV PUSH  6/25/18 10:45


     


 


 


  (Lizbeth-Colace)  1 tab  BID


 PO  6/25/18 21:00


     


 


 


  (Milk Of


 Magnesia Liq)  30 ml  Q12H  PRN


 PO  6/25/18 10:45


     


 


 


  (Senokot)  17.2 mg  Q12H  PRN


 PO  6/25/18 10:45


     


 


 


  (Dulcolax Supp)  10 mg  DAILY  PRN


 RECTAL  6/25/18 10:45


     


 


 


  (Lactulose Liq)  30 ml  DAILY  PRN


 PO  6/25/18 10:45


     


 


 


  (Lasix Inj)  40 mg  BID@09,18


 IV PUSH  6/25/18 18:00


    6/26/18 08:33


 


 


  (KCl)  20 meq  DAILY


 PO  6/25/18 11:30


    6/26/18 08:33


 








Vital Signs / I&O





Vital Signs








  Date Time  Temp Pulse Resp B/P (MAP) Pulse Ox O2 Delivery O2 Flow Rate FiO2


 


6/26/18 16:00  67      


 


6/26/18 15:10 98.0 71 18 110/61 (77) 95   


 


6/26/18 15:10     95 Room Air  


 


6/26/18 15:00  72      


 


6/26/18 14:00  72      


 


6/26/18 13:00  71      


 


6/26/18 12:00  61      


 


6/26/18 11:15 98.1 72 18 138/78 (98) 93   


 


6/26/18 11:15     93 Room Air  


 


6/26/18 11:00  72      


 


6/26/18 10:00  71      


 


6/26/18 09:35     95   21


 


6/26/18 09:00  66      


 


6/26/18 08:00  84      


 


6/26/18 07:15 97.9 71 18 132/75 (94) 93   


 


6/26/18 07:15     93 Room Air  


 


6/26/18 07:00  66      


 


6/26/18 06:06  76      


 


6/26/18 05:29  62      


 


6/26/18 05:27 98.1 69  140/69 (92) 95   


 


6/26/18 04:33  62      


 


6/26/18 03:22  58      


 


6/26/18 02:36  62      


 


6/26/18 01:02  76      


 


6/26/18 00:13     98 Nasal Cannula 2.00 


 


6/26/18 00:12 98.2 63  127/62 (83) 98   


 


6/26/18 00:11  66      


 


6/25/18 23:00  63      


 


6/25/18 22:41  62      


 


6/25/18 22:18     94 Nasal Cannula 2.00 


 


6/25/18 21:06  75      


 


6/25/18 20:21  75      


 


6/25/18 19:57 98.3 69  117/58 (77) 92   


 


6/25/18 19:17  80      


 


6/25/18 18:06  82      


 


6/25/18 17:00  84      














I/O      


 


 6/25/18 6/25/18 6/25/18 6/26/18 6/26/18 6/26/18





 07:00 15:00 23:00 07:00 15:00 23:00


 


Intake Total   480 ml 720 ml  


 


Output Total   700 ml 950 ml  


 


Balance   -220 ml -230 ml  


 


      


 


Intake Oral   480 ml 720 ml  


 


Output Urine Total   700 ml 950 ml  


 


# Bowel Movements   1   








Physical Exam


GENERAL: In NAD.


SKIN: Warm and dry.


HEAD: Normocephalic.


EYES: No scleral icterus. No injection or drainage. 


NECK: Supple, trachea midline. No JVD or lymphadenopathy.


CARDIOVASCULAR: Regular rate and rhythm without murmurs, gallops, or rubs. 


RESPIRATORY: Breath sounds equal bilaterally. No accessory muscle use.


GASTROINTESTINAL: Abdomen soft, non-tender, nondistended. 


MUSCULOSKELETAL: No cyanosis, or edema.





Laboratory





Laboratory Tests








Test


  6/25/18


20:34 6/26/18


04:51


 


Total Creatine Kinase 142 U/L  


 


Troponin I 0.64 NG/ML  


 


White Blood Count  6.4 TH/MM3 


 


Red Blood Count  4.34 MIL/MM3 


 


Hemoglobin  13.6 GM/DL 


 


Hematocrit  40.2 % 


 


Mean Corpuscular Volume  92.7 FL 


 


Mean Corpuscular Hemoglobin  31.3 PG 


 


Mean Corpuscular Hemoglobin


Concent 


  33.8 % 


 


 


Red Cell Distribution Width  14.2 % 


 


Platelet Count  151 TH/MM3 


 


Mean Platelet Volume  8.4 FL 


 


Neutrophils (%) (Auto)  58.0 % 


 


Lymphocytes (%) (Auto)  30.2 % 


 


Monocytes (%) (Auto)  9.2 % 


 


Eosinophils (%) (Auto)  2.4 % 


 


Basophils (%) (Auto)  0.2 % 


 


Neutrophils # (Auto)  3.7 TH/MM3 


 


Lymphocytes # (Auto)  1.9 TH/MM3 


 


Monocytes # (Auto)  0.6 TH/MM3 


 


Eosinophils # (Auto)  0.2 TH/MM3 


 


Basophils # (Auto)  0.0 TH/MM3 


 


CBC Comment  DIFF FINAL 


 


Differential Comment   


 


Blood Urea Nitrogen  14 MG/DL 


 


Creatinine  0.72 MG/DL 


 


Random Glucose  94 MG/DL 


 


Calcium Level  8.5 MG/DL 


 


Sodium Level  142 MEQ/L 


 


Potassium Level  3.4 MEQ/L 


 


Chloride Level  105 MEQ/L 


 


Carbon Dioxide Level  29.3 MEQ/L 


 


Anion Gap  8 MEQ/L 


 


Estimat Glomerular Filtration


Rate 


  79 ML/MIN 


 











Assessment and Plan


Problem List:  


(1) Acute on chronic systolic congestive heart failure


ICD Codes:  I50.23 - Acute on chronic systolic congestive heart failure


Status:  Resolved


(2) Coronary artery disease


ICD Codes:  I25.10 - Coronary artery disease


Status:  Chronic


(3) Elevated troponin I level


ICD Codes:  R79.89 - Elevated troponin I level


Status:  Acute


(4) Hypertension


ICD Codes:  I10 - Hypertension


Status:  Chronic


(5) Afib


ICD Codes:  I48.91 - Unspecified atrial fibrillation


Status:  Chronic


(6) ICD (implantable cardioverter-defibrillator) in place


ICD Codes:  Z95.810 - Presence of automatic (implantable) cardiac defibrillator


Assessment and Plan


Overall improvement with diuresis. Will add spironolactone. Echo with severe LV 

systolic dysfunction. OK to discharge home. F/u w Dr. Akbar soon after 

discharge.











Tanisha Beltrán MD Jun 26, 2018 16:25

## 2018-06-26 NOTE — HHI.PR
Subjective


Remarks


Pt denies any chest pains, states that her SOB has completely resolved at rest 

and w ambulation. denies any nausea or vomiting.





Objective


Vitals





Vital Signs








  Date Time  Temp Pulse Resp B/P (MAP) Pulse Ox O2 Delivery O2 Flow Rate FiO2


 


6/26/18 07:15 97.9 71 18 132/75 (94) 93   


 


6/26/18 07:15     93 Room Air  


 


6/26/18 06:06  76      


 


6/26/18 05:29  62      


 


6/26/18 05:27 98.1 69  140/69 (92) 95   


 


6/26/18 04:33  62      


 


6/26/18 03:22  58      


 


6/26/18 02:36  62      


 


6/26/18 01:02  76      


 


6/26/18 00:13     98 Nasal Cannula 2.00 


 


6/26/18 00:12 98.2 63  127/62 (83) 98   


 


6/26/18 00:11  66      


 


6/25/18 23:00  63      


 


6/25/18 22:41  62      


 


6/25/18 22:18     94 Nasal Cannula 2.00 


 


6/25/18 21:06  75      


 


6/25/18 20:21  75      


 


6/25/18 19:57 98.3 69  117/58 (77) 92   


 


6/25/18 19:17  80      


 


6/25/18 18:06  82      


 


6/25/18 17:00  84      


 


6/25/18 16:22     94   21


 


6/25/18 16:01  77      


 


6/25/18 15:12 98.1 72 18 128/81 (97) 95   


 


6/25/18 15:12  76      


 


6/25/18 14:02  86      


 


6/25/18 13:12  65      


 


6/25/18 12:05  88      


 


6/25/18 11:39        


 


6/25/18 11:03  70 18 135/71 (92) 95 Nasal Cannula 2.00 














I/O      


 


 6/25/18 6/25/18 6/25/18 6/26/18 6/26/18 6/26/18





 07:00 15:00 23:00 07:00 15:00 23:00


 


Intake Total   480 ml 720 ml  


 


Output Total   700 ml 950 ml  


 


Balance   -220 ml -230 ml  


 


      


 


Intake Oral   480 ml 720 ml  


 


Output Urine Total   700 ml 950 ml  


 


# Bowel Movements   1   








Result Diagram:  


6/26/18 0451                                                                   

             6/26/18 0451





Imaging





Last Impressions








Chest X-Ray 6/25/18 0816 Signed





Impressions: 





 CONCLUSION: 





 1.  Cardiomegaly with mild positive fluid balance superimposed on chronic inter





 stitial changes.





 2.  Minimal bibasilar airspace disease, likely atelectasis.





  





 








Objective Remarks


GENERAL: sitting up in bed, appears comfortable. 


CARDIOVASCULAR: Regular rate and rhythm without murmurs 


RESPIRATORY: Decreased breath sounds at both bases, however no crackles or 

wheezes. 


GASTROINTESTINAL: Abdomen soft, non-tender, nondistended.  


EXTREMITIES: No lower extremity edema. No calf tenderness.





A/P


Problem List:  


(1) Elevated troponin I level


ICD Code:  R79.89 - Elevated troponin I level


Status:  Acute


(2) Acute on chronic systolic congestive heart failure


ICD Code:  I50.23 - Acute on chronic systolic congestive heart failure


Status:  Resolved


(3) ICD (implantable cardioverter-defibrillator) in place


ICD Code:  Z95.810 - Presence of automatic (implantable) cardiac defibrillator


(4) Hypertension


ICD Code:  I10 - Hypertension


Status:  Chronic


(5) Coronary artery disease


ICD Code:  I25.10 - Coronary artery disease


Status:  Chronic


Assessment and Plan


1.  Acute exacerbation of chronic systolic congestive heart failure: Continue 

diuresis with Lasix IV then transition to po if ok w cards.  Check 2D 

echocardiogram.  Dr. Beltrán, cardiologist, evaluated the patient.  Monitor on 

telemetry.  Continue Coreg.


2.  Elevated troponin: Trop 0.29--> 0.58--> 0.64. Pt is chest pain free. 

awaiting final recs from cards. 


3.  Hypertension: Continue Lasix, captopril, Coreg.


4.  Atrial fibrillation: Continue Coreg.  Continue Eliquis.


5.  DVT prophylaxis: Eliquis.


Discharge Planning


awaiting final recs from cards. 2D echo pending.











Ele Delarosa MD Jun 26, 2018 08:52

## 2018-06-26 NOTE — ECHRPT
Indication:   HEART FAILURE 

 

 CONCLUSIONS 

 Mildly dilated left ventricle.  

 Mild concentric left ventricular hypertrophy.  

 The left ventricular systolic function is severely reduced with an estimated ejection fraction in th
e range of  

 20-25%.  

 A pacemaker wire is noted.  

 The left atrial size is mild-to-moderately dilated.  

 The right atrial size is mildly dilated.  

 There is a pacemaker wire present in the right atrial cavity.  

 Mild mitral valve regurgitation.  

 There is mild tricuspid valve regurgitation.  

 The estimated pulmonary arterial pressure is 33 mmHg.  

 

 

 

 

 BP:        /         HR:                          Rhythm:           Sinus 

 

 MEASUREMENTS  (Male / Female) Normal Values       Technical Quality:Fair 

 2D ECHO 

 LV Diastolic Diameter PLAX        6.4 cm                4.2 - 5.9 / 3.9 - 5.3 cm 

 LV Systolic Diameter PLAX         6.0 cm                 

 IVS Diastolic Thickness           1.2 cm                0.6 - 1.0 / 0.6 - 0.9 cm 

 LVPW Diastolic Thickness          1.2 cm                0.6 - 1.0 / 0.6 - 0.9 cm 

 LV Relative Wall Thickness        0.4                    

 RV Internal Dim ED PLAX           2.3 cm                 

 LA Systolic Diameter LX           4.6 cm                3.0 - 4.0 / 2.7 - 3.8 cm 

 

 DOPPLER 

 AV Peak Velocity                  153.0 cm/s             

 AV Peak Gradient                  9.4 mmHg               

 AV Mean Gradient                  5.0 mmHg               

 AV Velocity Time Integral         27.7 cm                

 LVOT Peak Velocity                50.4 cm/s              

 LVOT Peak Gradient                1.0 mmHg               

 LVOT Velocity Time Integral       9.0 cm                 

 Mitral E Point Velocity           71.1 cm/s              

 Mitral A Point Velocity           107.0 cm/s             

 Mitral E to A Ratio               0.7                    

 LV E' Lateral Velocity            6.3 cm/s               

 Mitral E to LV E' Lateral Ratio   11.2                   

 LV E' Septal Velocity             3.7 cm/s               

 Mitral E to LV E' Septal Ratio    19.2                   

 TR Peak Velocity                  238.0 cm/s             

 TR Peak Gradient                  22.7 mmHg              

 Right Atrial Pressure             10.0 mmHg              

 Pulmonary Artery Systolic Pressu  32.7 mmHg              

 Right Ventricular Systolic Press  32.7 mmHg              

 PV Peak Velocity                  69.2 cm/s              

 PV Peak Gradient                  1.9 mmHg               

 

 

 FINDINGS 

 

 LEFT VENTRICLE 

 Mildly dilated left ventricle.  

 Mild concentric left ventricular hypertrophy.  

 The left ventricular systolic function is severely reduced with an estimated ejection fraction in th
e range of  

 20-25%.  

 

 RIGHT VENTRICLE 

 Normal right ventricular size and systolic function.  

 A pacemaker wire is noted.  

 

 LEFT ATRIUM 

 The left atrial size is mild-to-moderately dilated.  

 

 RIGHT ATRIUM 

 The right atrial size is mildly dilated.  

 There is a pacemaker wire present in the right atrial cavity.  

 

 ATRIAL SEPTUM 

 No atrial level shunt is demonstrated by color flow Doppler interrogation.  

 

 AORTA 

 The aortic root and proximal ascending aorta are normal in size on limited imaging.  

 

 MITRAL VALVE 

 Mild mitral valve regurgitation.  

 

 AORTIC VALVE 

 Trileaflet aortic valve. No aortic valve stenosis or regurgitation.  

 

 TRICUSPID VALVE 

 There is mild tricuspid valve regurgitation.  

 The estimated pulmonary arterial pressure is 32.7 mmHg.  

 

 PULMONARY VALVE 

 No pulmonary valve regurgitation or stenosis.  

 

 VESSELS 

 The inferior vena cava is normal in size.  

 

 PERICARDIUM 

 No pericardial effusion.  

 

 

 

 

  Tanisha Beltrán MD, FACC 

  (Electronically Signed) 

  Final Date:26 June 2018 16:19